# Patient Record
Sex: FEMALE | Race: OTHER | HISPANIC OR LATINO | URBAN - METROPOLITAN AREA
[De-identification: names, ages, dates, MRNs, and addresses within clinical notes are randomized per-mention and may not be internally consistent; named-entity substitution may affect disease eponyms.]

---

## 2017-03-11 ENCOUNTER — EMERGENCY (EMERGENCY)
Facility: HOSPITAL | Age: 27
LOS: 1 days | End: 2017-03-11
Admitting: INTERNAL MEDICINE
Payer: MEDICAID

## 2017-03-11 PROCEDURE — 99284 EMERGENCY DEPT VISIT MOD MDM: CPT | Mod: 25

## 2017-03-11 PROCEDURE — 81025 URINE PREGNANCY TEST: CPT

## 2017-03-11 PROCEDURE — 99284 EMERGENCY DEPT VISIT MOD MDM: CPT

## 2017-03-11 PROCEDURE — 76775 US EXAM ABDO BACK WALL LIM: CPT

## 2017-03-11 PROCEDURE — 76775 US EXAM ABDO BACK WALL LIM: CPT | Mod: 26

## 2017-03-11 PROCEDURE — 81001 URINALYSIS AUTO W/SCOPE: CPT

## 2017-03-27 ENCOUNTER — TRANSCRIPTION ENCOUNTER (OUTPATIENT)
Age: 27
End: 2017-03-27

## 2017-05-17 ENCOUNTER — TRANSCRIPTION ENCOUNTER (OUTPATIENT)
Age: 27
End: 2017-05-17

## 2017-08-05 ENCOUNTER — EMERGENCY (EMERGENCY)
Facility: HOSPITAL | Age: 27
LOS: 0 days | Discharge: HOME | End: 2017-08-05
Attending: EMERGENCY MEDICINE | Admitting: EMERGENCY MEDICINE
Payer: MEDICAID

## 2017-08-05 VITALS — WEIGHT: 141.98 LBS | HEIGHT: 62 IN

## 2017-08-05 VITALS
TEMPERATURE: 98 F | OXYGEN SATURATION: 100 % | HEART RATE: 81 BPM | DIASTOLIC BLOOD PRESSURE: 65 MMHG | SYSTOLIC BLOOD PRESSURE: 113 MMHG | RESPIRATION RATE: 18 BRPM

## 2017-08-05 DIAGNOSIS — R10.2 PELVIC AND PERINEAL PAIN: ICD-10-CM

## 2017-08-05 DIAGNOSIS — O26.899 OTHER SPECIFIED PREGNANCY RELATED CONDITIONS, UNSPECIFIED TRIMESTER: ICD-10-CM

## 2017-08-05 PROCEDURE — 99284 EMERGENCY DEPT VISIT MOD MDM: CPT

## 2017-08-05 NOTE — ED STATDOCS - OBJECTIVE STATEMENT
26 y/o F presents to ED c/o R pelvic pain. Pt states she is 17 weeks pregnant and that this is her third pregnancy, second was ectopic. Reports that when she is not moving, there is no pain. Pt has no other complaints and denies SOB, CP, fever/chills, n/v/d and HA. 28 y/o F presents to ED c/o R pelvic pain. Pt states she is 17 weeks pregnant and that this is her third pregnancy, second was ectopic. Reports that when she is not moving there is no pain. Pt has no other complaints and denies SOB, CP, fever/chills, n/v/d and HA.  Had US which showed IUP

## 2017-08-05 NOTE — ED ADULT NURSE NOTE - OBJECTIVE STATEMENT
Patient comes to ED for right pelvic pain. Pt is 17 weeks pregnant. pt denies any pain or blood in urine

## 2017-08-05 NOTE — ED STATDOCS - PROGRESS NOTE DETAILS
DEMARCUS Vang:   Patient has been seen, evaluated and orders have been written by the attending in intake. Patient is stable.  I will follow up the results of orders written and I will continue to evaluate/observe the patient.  Abigail Vang PA-C  144 by ryan.

## 2017-11-25 ENCOUNTER — EMERGENCY (EMERGENCY)
Facility: HOSPITAL | Age: 27
LOS: 0 days | Discharge: HOME | End: 2017-11-25
Attending: EMERGENCY MEDICINE | Admitting: EMERGENCY MEDICINE
Payer: MEDICAID

## 2017-11-25 VITALS
HEART RATE: 84 BPM | DIASTOLIC BLOOD PRESSURE: 77 MMHG | OXYGEN SATURATION: 100 % | SYSTOLIC BLOOD PRESSURE: 109 MMHG | RESPIRATION RATE: 17 BRPM | TEMPERATURE: 98 F

## 2017-11-25 VITALS — HEIGHT: 62 IN | WEIGHT: 151.02 LBS

## 2017-11-25 DIAGNOSIS — R10.9 UNSPECIFIED ABDOMINAL PAIN: ICD-10-CM

## 2017-11-25 DIAGNOSIS — K52.0 GASTROENTERITIS AND COLITIS DUE TO RADIATION: ICD-10-CM

## 2017-11-25 DIAGNOSIS — M79.1 MYALGIA: ICD-10-CM

## 2017-11-25 LAB
ALBUMIN SERPL ELPH-MCNC: 3.1 G/DL — LOW (ref 3.3–5)
ALP SERPL-CCNC: 143 U/L — HIGH (ref 40–120)
ALT FLD-CCNC: 20 U/L — SIGNIFICANT CHANGE UP (ref 12–78)
ANION GAP SERPL CALC-SCNC: 8 MMOL/L — SIGNIFICANT CHANGE UP (ref 5–17)
AST SERPL-CCNC: 24 U/L — SIGNIFICANT CHANGE UP (ref 15–37)
BASOPHILS # BLD AUTO: 0 K/UL — SIGNIFICANT CHANGE UP (ref 0–0.2)
BASOPHILS NFR BLD AUTO: 0.3 % — SIGNIFICANT CHANGE UP (ref 0–2)
BILIRUB SERPL-MCNC: 0.8 MG/DL — SIGNIFICANT CHANGE UP (ref 0.2–1.2)
BUN SERPL-MCNC: 9 MG/DL — SIGNIFICANT CHANGE UP (ref 7–23)
CALCIUM SERPL-MCNC: 8.6 MG/DL — SIGNIFICANT CHANGE UP (ref 8.5–10.1)
CHLORIDE SERPL-SCNC: 105 MMOL/L — SIGNIFICANT CHANGE UP (ref 96–108)
CO2 SERPL-SCNC: 24 MMOL/L — SIGNIFICANT CHANGE UP (ref 22–31)
CREAT SERPL-MCNC: 0.71 MG/DL — SIGNIFICANT CHANGE UP (ref 0.5–1.3)
EOSINOPHIL # BLD AUTO: 0.1 K/UL — SIGNIFICANT CHANGE UP (ref 0–0.5)
EOSINOPHIL NFR BLD AUTO: 1 % — SIGNIFICANT CHANGE UP (ref 0–6)
GLUCOSE SERPL-MCNC: 87 MG/DL — SIGNIFICANT CHANGE UP (ref 70–99)
HCT VFR BLD CALC: 36.7 % — SIGNIFICANT CHANGE UP (ref 34.5–45)
HGB BLD-MCNC: 12.2 G/DL — SIGNIFICANT CHANGE UP (ref 11.5–15.5)
LIDOCAIN IGE QN: 177 U/L — SIGNIFICANT CHANGE UP (ref 73–393)
LYMPHOCYTES # BLD AUTO: 1.1 K/UL — SIGNIFICANT CHANGE UP (ref 1–3.3)
LYMPHOCYTES # BLD AUTO: 11.8 % — LOW (ref 13–44)
MCHC RBC-ENTMCNC: 29.5 PG — SIGNIFICANT CHANGE UP (ref 27–34)
MCHC RBC-ENTMCNC: 33.2 GM/DL — SIGNIFICANT CHANGE UP (ref 32–36)
MCV RBC AUTO: 88.7 FL — SIGNIFICANT CHANGE UP (ref 80–100)
MONOCYTES # BLD AUTO: 0.4 K/UL — SIGNIFICANT CHANGE UP (ref 0–0.9)
MONOCYTES NFR BLD AUTO: 4.8 % — SIGNIFICANT CHANGE UP (ref 2–14)
NEUTROPHILS # BLD AUTO: 7.6 K/UL — HIGH (ref 1.8–7.4)
NEUTROPHILS NFR BLD AUTO: 82.1 % — HIGH (ref 43–77)
PLATELET # BLD AUTO: 223 K/UL — SIGNIFICANT CHANGE UP (ref 150–400)
POTASSIUM SERPL-MCNC: 3.8 MMOL/L — SIGNIFICANT CHANGE UP (ref 3.5–5.3)
POTASSIUM SERPL-SCNC: 3.8 MMOL/L — SIGNIFICANT CHANGE UP (ref 3.5–5.3)
PROT SERPL-MCNC: 7.9 GM/DL — SIGNIFICANT CHANGE UP (ref 6–8.3)
RBC # BLD: 4.13 M/UL — SIGNIFICANT CHANGE UP (ref 3.8–5.2)
RBC # FLD: 12.5 % — SIGNIFICANT CHANGE UP (ref 10.3–14.5)
SODIUM SERPL-SCNC: 137 MMOL/L — SIGNIFICANT CHANGE UP (ref 135–145)
WBC # BLD: 9.3 K/UL — SIGNIFICANT CHANGE UP (ref 3.8–10.5)
WBC # FLD AUTO: 9.3 K/UL — SIGNIFICANT CHANGE UP (ref 3.8–10.5)

## 2017-11-25 PROCEDURE — 99284 EMERGENCY DEPT VISIT MOD MDM: CPT

## 2017-11-25 RX ORDER — SODIUM CHLORIDE 9 MG/ML
2000 INJECTION INTRAMUSCULAR; INTRAVENOUS; SUBCUTANEOUS ONCE
Qty: 0 | Refills: 0 | Status: COMPLETED | OUTPATIENT
Start: 2017-11-25 | End: 2017-11-25

## 2017-11-25 RX ORDER — ACETAMINOPHEN 500 MG
650 TABLET ORAL ONCE
Qty: 0 | Refills: 0 | Status: COMPLETED | OUTPATIENT
Start: 2017-11-25 | End: 2017-11-25

## 2017-11-25 RX ORDER — FAMOTIDINE 10 MG/ML
20 INJECTION INTRAVENOUS ONCE
Qty: 0 | Refills: 0 | Status: COMPLETED | OUTPATIENT
Start: 2017-11-25 | End: 2017-11-25

## 2017-11-25 RX ORDER — SODIUM CHLORIDE 9 MG/ML
3 INJECTION INTRAMUSCULAR; INTRAVENOUS; SUBCUTANEOUS ONCE
Qty: 0 | Refills: 0 | Status: COMPLETED | OUTPATIENT
Start: 2017-11-25 | End: 2017-11-25

## 2017-11-25 RX ORDER — ONDANSETRON 8 MG/1
4 TABLET, FILM COATED ORAL ONCE
Qty: 0 | Refills: 0 | Status: COMPLETED | OUTPATIENT
Start: 2017-11-25 | End: 2017-11-25

## 2017-11-25 RX ADMIN — SODIUM CHLORIDE 3 MILLILITER(S): 9 INJECTION INTRAMUSCULAR; INTRAVENOUS; SUBCUTANEOUS at 21:35

## 2017-11-25 RX ADMIN — ONDANSETRON 4 MILLIGRAM(S): 8 TABLET, FILM COATED ORAL at 21:35

## 2017-11-25 RX ADMIN — FAMOTIDINE 20 MILLIGRAM(S): 10 INJECTION INTRAVENOUS at 21:35

## 2017-11-25 RX ADMIN — SODIUM CHLORIDE 2000 MILLILITER(S): 9 INJECTION INTRAMUSCULAR; INTRAVENOUS; SUBCUTANEOUS at 21:35

## 2017-11-25 RX ADMIN — Medication 650 MILLIGRAM(S): at 21:35

## 2017-11-25 NOTE — ED ADULT NURSE NOTE - OBJECTIVE STATEMENT
pt amb to ST1, A&ox3, skin w/d/i, 34 weeks pregnant without complications, G3, presents with nausea, vomiting, and diarrhea that began this morning associated w/ abdm pain, tolerating PO intake however decreased PO intake, Sl placed, labs sent, pt moved to  for further monitoring.

## 2017-11-25 NOTE — ED ADULT NURSE REASSESSMENT NOTE - NS ED NURSE REASSESS COMMENT FT1
+ accels, moderate variability, no decels. Pt with no vaginal bleeding or leaking of fluid. Pt resting comfortably with no complaints. FHR tracing reviewed by Dr Hernandez.

## 2017-11-25 NOTE — ED STATDOCS - PHYSICAL EXAMINATION
GEN: AOX3, NAD. HEENT: Throat clear. Head NC/AT. NECK: Supple, No JVD. FROM. C-spine non-tender. CV:S1S2, RRR, LUNGS: CTA/b/l, no w/r/r. ABD: Soft, NT/ND, no rebound, no guarding. No CVAT. EXT: No e/c/c. 2+ distal pulses. SKIN: No rashes. NEURO: No focal deficits. CN II-XII intact. FROM. 5/5 motor and sensory. DEMARCUS Cancino GEN: AOX3, NAD. HEENT: Throat clear. Head NC/AT. NECK: Supple, No JVD. FROM. C-spine non-tender. CV:S1S2, RRR, LUNGS: CTA/b/l, no w/r/r. ABD: Soft, NT/ND, no rebound, no guarding. Pregnant uterus. No CVAT. EXT: No e/c/c. 2+ distal pulses. SKIN: No rashes. NEURO: No focal deficits. CN II-XII intact. FROM. 5/5 motor and sensory. DEMARCUS Cancino

## 2017-11-25 NOTE — ED STATDOCS - OBJECTIVE STATEMENT
26 yo female, 34 weeks pregnant without complications, G3, presents with nausea, vomiting, and diarrhea that began this morning.  Has abdominal pressure and myalgias.  No sick contacts.  Decreased PO intake.

## 2017-11-25 NOTE — ED STATDOCS - PROGRESS NOTE DETAILS
patient cleared by OB for dc. Patient is feeling much better, tests/labs reviewed. case discussed with attending. OK to dc home. DEMARCUS Cancino

## 2017-11-25 NOTE — ED STATDOCS - ATTENDING CONTRIBUTION TO CARE
Attending Contribution to Care: I, Nelly Box, performed the initial face to face bedside interview with this patient regarding history of present illness, review of symptoms and relevant past medical, social and family history.  I completed an independent physical examination.  I was the initial provider who evaluated this patient. I have signed out the follow up of any pending tests (i.e. labs, radiological studies) to the ACP.  I have communicated the patient’s plan of care and disposition with the ACP.

## 2017-11-25 NOTE — ED ADULT NURSE NOTE - PMH
Calculus of gallbladder with other cholecystitis without obstruction    Ectopic pregnancy, unspecified  S/P Methotrexate Oct 2015  No pertinent past medical history

## 2017-11-25 NOTE — ED STATDOCS - MEDICAL DECISION MAKING DETAILS
26 yo female, 34 weeks gestation, with nausea, vomiting, diarrhea.  Plan IVF, Tylenol, zofran, pepcid, labs, reassess.

## 2017-12-01 ENCOUNTER — OUTPATIENT (OUTPATIENT)
Dept: OUTPATIENT SERVICES | Facility: HOSPITAL | Age: 27
LOS: 1 days | End: 2017-12-01
Payer: MEDICAID

## 2017-12-01 PROCEDURE — G9001: CPT

## 2017-12-27 ENCOUNTER — INPATIENT (INPATIENT)
Facility: HOSPITAL | Age: 27
LOS: 1 days | Discharge: HOME | End: 2017-12-29
Attending: OBSTETRICS & GYNECOLOGY | Admitting: OBSTETRICS & GYNECOLOGY

## 2017-12-27 VITALS — WEIGHT: 158.73 LBS | HEIGHT: 62 IN

## 2017-12-27 LAB
BASOPHILS # BLD AUTO: 0 K/UL — SIGNIFICANT CHANGE UP (ref 0–0.2)
BASOPHILS # BLD AUTO: 0.1 K/UL — SIGNIFICANT CHANGE UP (ref 0–0.2)
BASOPHILS NFR BLD AUTO: 0.5 % — SIGNIFICANT CHANGE UP (ref 0–2)
BASOPHILS NFR BLD AUTO: 0.6 % — SIGNIFICANT CHANGE UP (ref 0–2)
EOSINOPHIL # BLD AUTO: 0 K/UL — SIGNIFICANT CHANGE UP (ref 0–0.5)
EOSINOPHIL # BLD AUTO: 0.1 K/UL — SIGNIFICANT CHANGE UP (ref 0–0.5)
EOSINOPHIL NFR BLD AUTO: 0.3 % — SIGNIFICANT CHANGE UP (ref 0–6)
EOSINOPHIL NFR BLD AUTO: 0.9 % — SIGNIFICANT CHANGE UP (ref 0–6)
HCT VFR BLD CALC: 30.8 % — LOW (ref 34.5–45)
HCT VFR BLD CALC: 34.6 % — SIGNIFICANT CHANGE UP (ref 34.5–45)
HGB BLD-MCNC: 10.1 G/DL — LOW (ref 11.5–15.5)
HGB BLD-MCNC: 11 G/DL — LOW (ref 11.5–15.5)
LYMPHOCYTES # BLD AUTO: 19.7 % — SIGNIFICANT CHANGE UP (ref 13–44)
LYMPHOCYTES # BLD AUTO: 2.4 K/UL — SIGNIFICANT CHANGE UP (ref 1–3.3)
LYMPHOCYTES # BLD AUTO: 2.6 K/UL — SIGNIFICANT CHANGE UP (ref 1–3.3)
LYMPHOCYTES # BLD AUTO: 34.4 % — SIGNIFICANT CHANGE UP (ref 13–44)
MCHC RBC-ENTMCNC: 28.1 PG — SIGNIFICANT CHANGE UP (ref 27–34)
MCHC RBC-ENTMCNC: 28.9 PG — SIGNIFICANT CHANGE UP (ref 27–34)
MCHC RBC-ENTMCNC: 31.9 GM/DL — LOW (ref 32–36)
MCHC RBC-ENTMCNC: 32.8 GM/DL — SIGNIFICANT CHANGE UP (ref 32–36)
MCV RBC AUTO: 88 FL — SIGNIFICANT CHANGE UP (ref 80–100)
MCV RBC AUTO: 88.1 FL — SIGNIFICANT CHANGE UP (ref 80–100)
MONOCYTES # BLD AUTO: 0.4 K/UL — SIGNIFICANT CHANGE UP (ref 0–0.9)
MONOCYTES # BLD AUTO: 0.8 K/UL — SIGNIFICANT CHANGE UP (ref 0–0.9)
MONOCYTES NFR BLD AUTO: 6 % — SIGNIFICANT CHANGE UP (ref 2–14)
MONOCYTES NFR BLD AUTO: 6.2 % — SIGNIFICANT CHANGE UP (ref 2–14)
NEUTROPHILS # BLD AUTO: 4.3 K/UL — SIGNIFICANT CHANGE UP (ref 1.8–7.4)
NEUTROPHILS # BLD AUTO: 8.9 K/UL — HIGH (ref 1.8–7.4)
NEUTROPHILS NFR BLD AUTO: 58.1 % — SIGNIFICANT CHANGE UP (ref 43–77)
NEUTROPHILS NFR BLD AUTO: 73.3 % — SIGNIFICANT CHANGE UP (ref 43–77)
PLATELET # BLD AUTO: 160 K/UL — SIGNIFICANT CHANGE UP (ref 150–400)
PLATELET # BLD AUTO: 182 K/UL — SIGNIFICANT CHANGE UP (ref 150–400)
RBC # BLD: 3.49 M/UL — LOW (ref 3.8–5.2)
RBC # BLD: 3.93 M/UL — SIGNIFICANT CHANGE UP (ref 3.8–5.2)
RBC # FLD: 13.4 % — SIGNIFICANT CHANGE UP (ref 10.3–14.5)
RBC # FLD: 13.5 % — SIGNIFICANT CHANGE UP (ref 10.3–14.5)
T PALLIDUM AB TITR SER: NEGATIVE — SIGNIFICANT CHANGE UP
WBC # BLD: 12.2 K/UL — HIGH (ref 3.8–10.5)
WBC # BLD: 7.5 K/UL — SIGNIFICANT CHANGE UP (ref 3.8–10.5)
WBC # FLD AUTO: 12.2 K/UL — HIGH (ref 3.8–10.5)
WBC # FLD AUTO: 7.5 K/UL — SIGNIFICANT CHANGE UP (ref 3.8–10.5)

## 2017-12-27 RX ORDER — OXYCODONE AND ACETAMINOPHEN 5; 325 MG/1; MG/1
2 TABLET ORAL EVERY 6 HOURS
Qty: 0 | Refills: 0 | Status: DISCONTINUED | OUTPATIENT
Start: 2017-12-27 | End: 2017-12-29

## 2017-12-27 RX ORDER — DIPHENHYDRAMINE HCL 50 MG
25 CAPSULE ORAL EVERY 6 HOURS
Qty: 0 | Refills: 0 | Status: DISCONTINUED | OUTPATIENT
Start: 2017-12-27 | End: 2017-12-29

## 2017-12-27 RX ORDER — GLYCERIN ADULT
1 SUPPOSITORY, RECTAL RECTAL AT BEDTIME
Qty: 0 | Refills: 0 | Status: DISCONTINUED | OUTPATIENT
Start: 2017-12-27 | End: 2017-12-29

## 2017-12-27 RX ORDER — ACETAMINOPHEN 500 MG
650 TABLET ORAL EVERY 6 HOURS
Qty: 0 | Refills: 0 | Status: DISCONTINUED | OUTPATIENT
Start: 2017-12-27 | End: 2017-12-29

## 2017-12-27 RX ORDER — OXYTOCIN 10 UNIT/ML
333.33 VIAL (ML) INJECTION
Qty: 20 | Refills: 0 | Status: DISCONTINUED | OUTPATIENT
Start: 2017-12-27 | End: 2017-12-29

## 2017-12-27 RX ORDER — OXYTOCIN 10 UNIT/ML
41.67 VIAL (ML) INJECTION
Qty: 20 | Refills: 0 | Status: DISCONTINUED | OUTPATIENT
Start: 2017-12-27 | End: 2017-12-29

## 2017-12-27 RX ORDER — SODIUM CHLORIDE 9 MG/ML
125 INJECTION, SOLUTION INTRAVENOUS ONCE
Qty: 0 | Refills: 0 | Status: COMPLETED | OUTPATIENT
Start: 2017-12-27 | End: 2017-12-27

## 2017-12-27 RX ORDER — MAGNESIUM HYDROXIDE 400 MG/1
30 TABLET, CHEWABLE ORAL
Qty: 0 | Refills: 0 | Status: DISCONTINUED | OUTPATIENT
Start: 2017-12-27 | End: 2017-12-29

## 2017-12-27 RX ORDER — PENICILLIN G POTASSIUM 5000000 [IU]/1
POWDER, FOR SOLUTION INTRAMUSCULAR; INTRAPLEURAL; INTRATHECAL; INTRAVENOUS
Qty: 0 | Refills: 0 | Status: DISCONTINUED | OUTPATIENT
Start: 2017-12-27 | End: 2017-12-27

## 2017-12-27 RX ORDER — SODIUM CHLORIDE 9 MG/ML
1000 INJECTION, SOLUTION INTRAVENOUS
Qty: 0 | Refills: 0 | Status: DISCONTINUED | OUTPATIENT
Start: 2017-12-27 | End: 2017-12-27

## 2017-12-27 RX ORDER — PENICILLIN G POTASSIUM 5000000 [IU]/1
5 POWDER, FOR SOLUTION INTRAMUSCULAR; INTRAPLEURAL; INTRATHECAL; INTRAVENOUS ONCE
Qty: 0 | Refills: 0 | Status: COMPLETED | OUTPATIENT
Start: 2017-12-27 | End: 2017-12-27

## 2017-12-27 RX ORDER — SIMETHICONE 80 MG/1
80 TABLET, CHEWABLE ORAL EVERY 6 HOURS
Qty: 0 | Refills: 0 | Status: DISCONTINUED | OUTPATIENT
Start: 2017-12-27 | End: 2017-12-29

## 2017-12-27 RX ORDER — OXYTOCIN 10 UNIT/ML
333.33 VIAL (ML) INJECTION
Qty: 20 | Refills: 0 | Status: COMPLETED | OUTPATIENT
Start: 2017-12-27

## 2017-12-27 RX ORDER — IBUPROFEN 200 MG
600 TABLET ORAL EVERY 6 HOURS
Qty: 0 | Refills: 0 | Status: DISCONTINUED | OUTPATIENT
Start: 2017-12-27 | End: 2017-12-29

## 2017-12-27 RX ORDER — DOCUSATE SODIUM 100 MG
100 CAPSULE ORAL
Qty: 0 | Refills: 0 | Status: DISCONTINUED | OUTPATIENT
Start: 2017-12-27 | End: 2017-12-29

## 2017-12-27 RX ORDER — LANOLIN
1 OINTMENT (GRAM) TOPICAL EVERY 6 HOURS
Qty: 0 | Refills: 0 | Status: DISCONTINUED | OUTPATIENT
Start: 2017-12-27 | End: 2017-12-29

## 2017-12-27 RX ORDER — PENICILLIN G POTASSIUM 5000000 [IU]/1
2.5 POWDER, FOR SOLUTION INTRAMUSCULAR; INTRAPLEURAL; INTRATHECAL; INTRAVENOUS EVERY 4 HOURS
Qty: 0 | Refills: 0 | Status: DISCONTINUED | OUTPATIENT
Start: 2017-12-27 | End: 2017-12-27

## 2017-12-27 RX ORDER — DIBUCAINE 1 %
1 OINTMENT (GRAM) RECTAL EVERY 4 HOURS
Qty: 0 | Refills: 0 | Status: DISCONTINUED | OUTPATIENT
Start: 2017-12-27 | End: 2017-12-29

## 2017-12-27 RX ORDER — PRAMOXINE HYDROCHLORIDE 150 MG/15G
1 AEROSOL, FOAM RECTAL EVERY 4 HOURS
Qty: 0 | Refills: 0 | Status: DISCONTINUED | OUTPATIENT
Start: 2017-12-27 | End: 2017-12-29

## 2017-12-27 RX ORDER — SODIUM CHLORIDE 9 MG/ML
3 INJECTION INTRAMUSCULAR; INTRAVENOUS; SUBCUTANEOUS EVERY 8 HOURS
Qty: 0 | Refills: 0 | Status: DISCONTINUED | OUTPATIENT
Start: 2017-12-27 | End: 2017-12-29

## 2017-12-27 RX ORDER — CITRIC ACID/SODIUM CITRATE 300-500 MG
15 SOLUTION, ORAL ORAL EVERY 4 HOURS
Qty: 0 | Refills: 0 | Status: DISCONTINUED | OUTPATIENT
Start: 2017-12-27 | End: 2017-12-27

## 2017-12-27 RX ORDER — TETANUS TOXOID, REDUCED DIPHTHERIA TOXOID AND ACELLULAR PERTUSSIS VACCINE, ADSORBED 5; 2.5; 8; 8; 2.5 [IU]/.5ML; [IU]/.5ML; UG/.5ML; UG/.5ML; UG/.5ML
0.5 SUSPENSION INTRAMUSCULAR ONCE
Qty: 0 | Refills: 0 | Status: COMPLETED | OUTPATIENT
Start: 2017-12-27

## 2017-12-27 RX ORDER — HYDROCORTISONE 1 %
1 OINTMENT (GRAM) TOPICAL EVERY 4 HOURS
Qty: 0 | Refills: 0 | Status: DISCONTINUED | OUTPATIENT
Start: 2017-12-27 | End: 2017-12-29

## 2017-12-27 RX ORDER — AER TRAVELER 0.5 G/1
1 SOLUTION RECTAL; TOPICAL EVERY 4 HOURS
Qty: 0 | Refills: 0 | Status: DISCONTINUED | OUTPATIENT
Start: 2017-12-27 | End: 2017-12-29

## 2017-12-27 RX ADMIN — Medication 600 MILLIGRAM(S): at 20:14

## 2017-12-27 RX ADMIN — SODIUM CHLORIDE 250 MILLILITER(S): 9 INJECTION, SOLUTION INTRAVENOUS at 08:22

## 2017-12-27 RX ADMIN — Medication 100 MILLIGRAM(S): at 20:14

## 2017-12-27 RX ADMIN — PENICILLIN G POTASSIUM 200 MILLION UNIT(S): 5000000 POWDER, FOR SOLUTION INTRAMUSCULAR; INTRAPLEURAL; INTRATHECAL; INTRAVENOUS at 08:05

## 2017-12-27 RX ADMIN — SODIUM CHLORIDE 125 MILLILITER(S): 9 INJECTION, SOLUTION INTRAVENOUS at 11:17

## 2017-12-27 RX ADMIN — Medication 1000 MILLIUNIT(S)/MIN: at 15:50

## 2017-12-27 RX ADMIN — Medication 600 MILLIGRAM(S): at 20:15

## 2017-12-27 RX ADMIN — PENICILLIN G POTASSIUM 200 MILLION UNIT(S): 5000000 POWDER, FOR SOLUTION INTRAMUSCULAR; INTRAPLEURAL; INTRATHECAL; INTRAVENOUS at 12:10

## 2017-12-27 RX ADMIN — OXYCODONE AND ACETAMINOPHEN 2 TABLET(S): 5; 325 TABLET ORAL at 20:14

## 2017-12-27 RX ADMIN — OXYCODONE AND ACETAMINOPHEN 2 TABLET(S): 5; 325 TABLET ORAL at 20:15

## 2017-12-28 DIAGNOSIS — R69 ILLNESS, UNSPECIFIED: ICD-10-CM

## 2017-12-28 RX ORDER — TETANUS TOXOID, REDUCED DIPHTHERIA TOXOID AND ACELLULAR PERTUSSIS VACCINE, ADSORBED 5; 2.5; 8; 8; 2.5 [IU]/.5ML; [IU]/.5ML; UG/.5ML; UG/.5ML; UG/.5ML
0.5 SUSPENSION INTRAMUSCULAR ONCE
Qty: 0 | Refills: 0 | Status: COMPLETED | OUTPATIENT
Start: 2017-12-28 | End: 2017-12-28

## 2017-12-28 RX ADMIN — Medication 600 MILLIGRAM(S): at 06:31

## 2017-12-28 RX ADMIN — Medication 100 MILLIGRAM(S): at 23:13

## 2017-12-28 RX ADMIN — TETANUS TOXOID, REDUCED DIPHTHERIA TOXOID AND ACELLULAR PERTUSSIS VACCINE, ADSORBED 0.5 MILLILITER(S): 5; 2.5; 8; 8; 2.5 SUSPENSION INTRAMUSCULAR at 22:24

## 2017-12-28 RX ADMIN — Medication 600 MILLIGRAM(S): at 22:24

## 2017-12-28 RX ADMIN — Medication 100 MILLIGRAM(S): at 12:04

## 2017-12-28 RX ADMIN — Medication 600 MILLIGRAM(S): at 23:24

## 2017-12-28 RX ADMIN — OXYCODONE AND ACETAMINOPHEN 2 TABLET(S): 5; 325 TABLET ORAL at 23:12

## 2017-12-28 RX ADMIN — Medication 1 TABLET(S): at 12:03

## 2017-12-29 ENCOUNTER — TRANSCRIPTION ENCOUNTER (OUTPATIENT)
Age: 27
End: 2017-12-29

## 2017-12-29 VITALS
SYSTOLIC BLOOD PRESSURE: 107 MMHG | HEART RATE: 82 BPM | OXYGEN SATURATION: 100 % | RESPIRATION RATE: 18 BRPM | TEMPERATURE: 98 F | DIASTOLIC BLOOD PRESSURE: 72 MMHG

## 2017-12-29 RX ORDER — IBUPROFEN 200 MG
1 TABLET ORAL
Qty: 28 | Refills: 0 | OUTPATIENT
Start: 2017-12-29 | End: 2018-01-04

## 2017-12-29 RX ORDER — IBUPROFEN 200 MG
1 TABLET ORAL
Qty: 28 | Refills: 0
Start: 2017-12-29 | End: 2018-01-04

## 2017-12-29 RX ADMIN — OXYCODONE AND ACETAMINOPHEN 2 TABLET(S): 5; 325 TABLET ORAL at 00:12

## 2017-12-29 NOTE — DISCHARGE NOTE OB - CARE PLAN
Principal Discharge DX:	Vaginal delivery  Goal:	healthy baby and healthy mother  Instructions for follow-up, activity and diet:	- follow up with Dr. Marin 6 weeks  - continue to breast feed  -  no intercourse for 6 weeks  - call primary care doctor or OBGYN if fever of 100.4

## 2017-12-29 NOTE — DISCHARGE NOTE OB - HOSPITAL COURSE
26 yo F  at 38 4/7 weeks JOE 18 presents in active labor at  L&D on 17. Pt describes experiencing contractions.  Pt has received prenatal care with Dr. Marin. Pt describes an uneventful pregnancy.  Pt was GBS positive and received ampicillin prior to delivery  Patient was  fully dilated and vtx +1. membranes intact. AROM done and scant clear fluid . Pt had a  live vertex female over intact perineum. Apgars: 9/9. Spontaneous expulsion of normal intact placenta. EBL:200 cc. Pt is doing well and stable for discharge. Pt denies any fever, headache, body aches, chills, sob, wheezing, chest congestion, chest pain, palpitations, nausea, vomiting, abdominal pain, diarrhea, or constipation. Pt denies any vaginal bleeding, b/l leg swelling, dysuria, urinary frequency, urgency. Pt has no further complaints. Both patient and  understand and agree with management of care. Pt will follow up with Dr. Landa in 6 weeks.

## 2017-12-29 NOTE — DISCHARGE NOTE OB - MEDICATION SUMMARY - MEDICATIONS TO STOP TAKING
I will STOP taking the medications listed below when I get home from the hospital:    Depo-Provera  --  intramuscular   -- Every 3 months    acetaminophen-oxyCODONE 325 mg-5 mg oral tablet  -- 1 tab(s) by mouth every 4 hours, As needed, Moderate Pain

## 2017-12-29 NOTE — DISCHARGE NOTE OB - CARE PROVIDER_API CALL
Ousmane Landa (MD), Obstetrics and Gynecology  [address] [place] [address] [address]  [place] [address]  Phone: [phone]  Fax: [phone]

## 2017-12-29 NOTE — DISCHARGE NOTE OB - PLAN OF CARE
healthy baby and healthy mother - follow up with Dr. Marin 6 weeks  - continue to breast feed  -  no intercourse for 6 weeks  - call primary care doctor or OBGYN if fever of 100.4

## 2017-12-29 NOTE — PROGRESS NOTE ADULT - SUBJECTIVE AND OBJECTIVE BOX
26 yo F  at 38 4/7 weeks JOE 18 presents in active labor.  Pt is s/p  day 2 and doing well.  Pt denies any fever, headache, vision changes, dizziness, body aches, chills, sob, wheezing, chest congestion, chest pain, palpitations, nausea, vomiting abdominal pain, diarrhea or constipation.  Pt denies any dysuria, breast pain, vaginal bleeding, b/l lower extremity swelling. She is urinating well, has not had a bowel movement but is passing gas. Pt is also ambulating well. Pt has no further complaints.     ICU Vital Signs Last 24 Hrs  T(C): 36.5 (29 Dec 2017 07:53), Max: 36.5 (29 Dec 2017 07:53)  T(F): 97.7 (29 Dec 2017 07:53), Max: 97.7 (29 Dec 2017 07:53)  HR: 82 (29 Dec 2017 07:53) (69 - 82)  BP: 107/72 (29 Dec 2017 07:53) (97/60 - 107/72)  RR: 18 (29 Dec 2017 07:53) (18 - 18)  SpO2: 100% (29 Dec 2017 07:53) (100% - 100%)    Gen: NAD  Lungs: CTA b/l air entry, no wheezing crackles, rales or ronchi  CVS: S1 and S2 no rubs, murmurs or gallops  Abd: soft, NT, ND, fundus firm below umbilicus  Ext: no tenderness  Neuro: AAOx 3     Labs    A: 32y PPD# 2 s/p    Doing well    Plan:  - encourage to continue breast feeding  - continue ambulation  - possible discharge today  - follow up with Dr. Landa in 6 weeks      Case Discussed with Dr. Rolf Dan MD PGY 1

## 2017-12-29 NOTE — DISCHARGE NOTE OB - MEDICATION SUMMARY - MEDICATIONS TO TAKE
I will START or STAY ON the medications listed below when I get home from the hospital:    ibuprofen 600 mg oral tablet  -- 1 tab(s) by mouth every 6 hours, Take with food.  -- Indication: For pain    Prenatal Multivitamins with Folic Acid 1 mg oral tablet  -- 1 tab(s) by mouth once a day  -- Indication: For suppliment

## 2018-01-05 DIAGNOSIS — Z3A.38 38 WEEKS GESTATION OF PREGNANCY: ICD-10-CM

## 2018-01-05 DIAGNOSIS — O99.820 STREPTOCOCCUS B CARRIER STATE COMPLICATING PREGNANCY: ICD-10-CM

## 2018-03-05 NOTE — ED ADULT NURSE NOTE - NS ED NURSE LEVEL OF CONSCIOUSNESS SPEECH
Improved after fluconazole. Gave rx for nystatin powder to use at first signs of rash. Pt will d/w OBGYN the possibility of diflucan with each period.   Speaking Coherently

## 2018-04-01 ENCOUNTER — OUTPATIENT (OUTPATIENT)
Dept: OUTPATIENT SERVICES | Facility: HOSPITAL | Age: 28
LOS: 1 days | End: 2018-04-01
Payer: MEDICAID

## 2018-04-01 PROCEDURE — G9001: CPT

## 2018-04-06 ENCOUNTER — APPOINTMENT (OUTPATIENT)
Dept: ANTEPARTUM | Facility: CLINIC | Age: 28
End: 2018-04-06
Payer: MEDICAID

## 2018-04-06 ENCOUNTER — ASOB RESULT (OUTPATIENT)
Age: 28
End: 2018-04-06

## 2018-04-06 PROCEDURE — 76857 US EXAM PELVIC LIMITED: CPT

## 2018-04-06 PROCEDURE — 76830 TRANSVAGINAL US NON-OB: CPT

## 2018-04-19 DIAGNOSIS — R69 ILLNESS, UNSPECIFIED: ICD-10-CM

## 2018-07-06 ENCOUNTER — APPOINTMENT (OUTPATIENT)
Dept: NEUROLOGY | Facility: CLINIC | Age: 28
End: 2018-07-06

## 2018-07-10 ENCOUNTER — APPOINTMENT (OUTPATIENT)
Dept: NEUROLOGY | Facility: CLINIC | Age: 28
End: 2018-07-10
Payer: MEDICAID

## 2018-07-10 VITALS
HEIGHT: 63 IN | HEART RATE: 88 BPM | SYSTOLIC BLOOD PRESSURE: 88 MMHG | WEIGHT: 135 LBS | DIASTOLIC BLOOD PRESSURE: 68 MMHG | BODY MASS INDEX: 23.92 KG/M2

## 2018-07-10 DIAGNOSIS — Z87.19 PERSONAL HISTORY OF OTHER DISEASES OF THE DIGESTIVE SYSTEM: ICD-10-CM

## 2018-07-10 PROCEDURE — 99204 OFFICE O/P NEW MOD 45 MIN: CPT

## 2018-07-10 RX ORDER — CHLORHEXIDINE GLUCONATE, 0.12% ORAL RINSE 1.2 MG/ML
0.12 SOLUTION DENTAL
Qty: 473 | Refills: 0 | Status: DISCONTINUED | COMMUNITY
Start: 2018-02-02

## 2018-07-10 RX ORDER — IBUPROFEN 600 MG/1
600 TABLET, FILM COATED ORAL
Qty: 28 | Refills: 0 | Status: DISCONTINUED | COMMUNITY
Start: 2017-12-29

## 2018-07-10 RX ORDER — NORETHINDRONE ACETATE AND ETHINYL ESTRADIOL, ETHINYL ESTRADIOL AND FERROUS FUMARATE 1MG-10(24)
1 MG-10 MCG / KIT ORAL
Qty: 28 | Refills: 0 | Status: DISCONTINUED | COMMUNITY
Start: 2018-02-13

## 2018-07-10 RX ORDER — TERCONAZOLE 4 MG/G
0.4 CREAM VAGINAL
Qty: 45 | Refills: 0 | Status: DISCONTINUED | COMMUNITY
Start: 2018-04-09

## 2018-07-20 ENCOUNTER — FORM ENCOUNTER (OUTPATIENT)
Age: 28
End: 2018-07-20

## 2018-07-21 ENCOUNTER — OUTPATIENT (OUTPATIENT)
Dept: OUTPATIENT SERVICES | Facility: HOSPITAL | Age: 28
LOS: 1 days | End: 2018-07-21
Payer: MEDICAID

## 2018-07-21 ENCOUNTER — APPOINTMENT (OUTPATIENT)
Dept: MRI IMAGING | Facility: CLINIC | Age: 28
End: 2018-07-21
Payer: MEDICAID

## 2018-07-21 DIAGNOSIS — Z00.8 ENCOUNTER FOR OTHER GENERAL EXAMINATION: ICD-10-CM

## 2018-07-21 PROCEDURE — 70551 MRI BRAIN STEM W/O DYE: CPT | Mod: 26

## 2018-07-21 PROCEDURE — 70551 MRI BRAIN STEM W/O DYE: CPT

## 2018-08-20 ENCOUNTER — APPOINTMENT (OUTPATIENT)
Dept: NEUROLOGY | Facility: CLINIC | Age: 28
End: 2018-08-20

## 2019-03-04 ENCOUNTER — APPOINTMENT (OUTPATIENT)
Age: 29
End: 2019-03-04

## 2019-03-10 ENCOUNTER — TRANSCRIPTION ENCOUNTER (OUTPATIENT)
Age: 29
End: 2019-03-10

## 2019-03-18 ENCOUNTER — APPOINTMENT (OUTPATIENT)
Age: 29
End: 2019-03-18

## 2019-03-18 ENCOUNTER — APPOINTMENT (OUTPATIENT)
Dept: NEUROLOGY | Facility: CLINIC | Age: 29
End: 2019-03-18

## 2019-04-08 ENCOUNTER — APPOINTMENT (OUTPATIENT)
Dept: NEUROLOGY | Facility: CLINIC | Age: 29
End: 2019-04-08

## 2019-05-02 ENCOUNTER — TRANSCRIPTION ENCOUNTER (OUTPATIENT)
Age: 29
End: 2019-05-02

## 2019-06-19 ENCOUNTER — TRANSCRIPTION ENCOUNTER (OUTPATIENT)
Age: 29
End: 2019-06-19

## 2019-08-16 ENCOUNTER — APPOINTMENT (OUTPATIENT)
Dept: NEUROLOGY | Facility: CLINIC | Age: 29
End: 2019-08-16
Payer: MEDICAID

## 2019-08-16 VITALS
WEIGHT: 140 LBS | HEART RATE: 80 BPM | HEIGHT: 63 IN | BODY MASS INDEX: 24.8 KG/M2 | SYSTOLIC BLOOD PRESSURE: 98 MMHG | DIASTOLIC BLOOD PRESSURE: 54 MMHG

## 2019-08-16 PROCEDURE — 99213 OFFICE O/P EST LOW 20 MIN: CPT

## 2019-08-16 NOTE — HISTORY OF PRESENT ILLNESS
[FreeTextEntry1] : I last saw Ms. Holley on 7/10/18.\par She repots that she continues to have pain on her scalp to the right of the vertex of her skull.\par It is tender to touch. The pain is there when she is not touching it as well.\par There is no associated nausea, vomiting, light sensitivity, noise sensitivity or visual changes.\par The pain is constant and when she touches it is a 6-7/10. Otherwise it is a constant discomfort.\par \par She saw dermatology for hair loss and mentioned the scalp discomfort. He said that she had a dermatitis and was given a shampoo which helped with itching of her scalp but not with the pain.\par \par She continues to have neck pain. The pain is localized without any radiation into her arms.\par There is no weakness in her arms or numbness/tingling.\par \par

## 2019-08-16 NOTE — CONSULT LETTER
[Dear  ___] : Dear  [unfilled], [Consult Letter:] : I had the pleasure of evaluating your patient, [unfilled]. [Please see my note below.] : Please see my note below. [Consult Closing:] : Thank you very much for allowing me to participate in the care of this patient.  If you have any questions, please do not hesitate to contact me. [FreeTextEntry2] : Kaushal Chacko [FreeTextEntry3] : Sincerely,\par \par \par Taylor Marcos MD\par Diplomate, American Academy of Psychiatry and Neurology\par Board Certified in the Subspecialty of Clinical Neurophysiology\par Board Certified in the Subspecialty of Sleep Medicine\par Board Certified in the Subspecialty of Epilepsy\par

## 2019-08-16 NOTE — DATA REVIEWED
[de-identified] : MRI brain 7/21/18: No parenchymal abnormality. 4 mm tonsillar ectopia, an anatomic variant. Tiny retention cysts in the bilateral maxillary alveolar recesses, the largest measuring 1.6 cm on the right.

## 2019-08-16 NOTE — PHYSICAL EXAM
[FreeTextEntry1] : Examination:\par Constitutional: normal, no apparent distress\par Eyes: normal conjunctiva b/l, no ptosis, visual fields full\par Respiratory: no respiratory distress, normal effort, normal auscultation\par Cardiovascular: normal rate, rhythm, no murmurs\par Neck: supple, no masses\par Vascular: carotids normal\par Skin: normal color, no rashes\par Psych: normal mood, affect\par skull: no abnormal lesions seen or felt with palpation\par \par Neurological:\par Memory: normal memory, oriented to person, place, time\par Language intact/no aphasia\par Cranial Nerves: II-XII intact, Pupils equally round and reactive to light, ocular muscles/movements intact, no ptosis, no facial weakness, tongue protrudes normally in the midline, \par Motor: normal tone, no pronator drift, full strength in all four extremities in the proximal and distal muscle groups\par Coordination: Fine motor movements intact, rapid alternating movements intact, finger to nose intact bilaterally\par Sensory: intact to light touch\par DTRs: symmetric, normal\par Gait: narrow based, steady\par

## 2019-08-16 NOTE — DISCUSSION/SUMMARY
[FreeTextEntry1] : Ms. Holley is a 29 year old woman with daily headaches/skull pain\par \par -Her neurological examination remains non-focal.\par -Previous MRI brain did not show any significant intracranial abnormality.\par -Symptoms seem to be more superficial involving scalp/skull.\par -Will get CT head with focus on bony windows to r/o any osseous lesion.\par -Reassurance given.\par -Trial of cyclobenzaprine 10 mg qhs x 10 days to help with any muscle tension which may be contributing. I explained that this may cause drowsiness. She is not pregnant and not planning on conceiving at this time.\par -If symptoms persist, a trial of gabapentin 300 mg qhs can be considered.\par -Neck pain appears muscular in nature and is less severe than previously.\par \par f/u 1 month, sooner if needed.\par

## 2019-08-25 ENCOUNTER — FORM ENCOUNTER (OUTPATIENT)
Age: 29
End: 2019-08-25

## 2019-08-26 ENCOUNTER — APPOINTMENT (OUTPATIENT)
Dept: CT IMAGING | Facility: CLINIC | Age: 29
End: 2019-08-26
Payer: MEDICAID

## 2019-08-26 ENCOUNTER — OUTPATIENT (OUTPATIENT)
Dept: OUTPATIENT SERVICES | Facility: HOSPITAL | Age: 29
LOS: 1 days | End: 2019-08-26
Payer: MEDICAID

## 2019-08-26 DIAGNOSIS — Z00.8 ENCOUNTER FOR OTHER GENERAL EXAMINATION: ICD-10-CM

## 2019-08-26 PROCEDURE — 70450 CT HEAD/BRAIN W/O DYE: CPT

## 2019-08-26 PROCEDURE — 70450 CT HEAD/BRAIN W/O DYE: CPT | Mod: 26

## 2019-09-20 ENCOUNTER — APPOINTMENT (OUTPATIENT)
Dept: NEUROLOGY | Facility: CLINIC | Age: 29
End: 2019-09-20

## 2019-12-11 ENCOUNTER — EMERGENCY (EMERGENCY)
Facility: HOSPITAL | Age: 29
LOS: 0 days | Discharge: HOME | End: 2019-12-11
Attending: EMERGENCY MEDICINE
Payer: MEDICAID

## 2019-12-11 VITALS
OXYGEN SATURATION: 100 % | HEART RATE: 85 BPM | HEIGHT: 62 IN | WEIGHT: 139.99 LBS | RESPIRATION RATE: 16 BRPM | DIASTOLIC BLOOD PRESSURE: 71 MMHG | SYSTOLIC BLOOD PRESSURE: 135 MMHG | TEMPERATURE: 99 F

## 2019-12-11 DIAGNOSIS — R10.9 UNSPECIFIED ABDOMINAL PAIN: ICD-10-CM

## 2019-12-11 DIAGNOSIS — Z90.49 ACQUIRED ABSENCE OF OTHER SPECIFIED PARTS OF DIGESTIVE TRACT: Chronic | ICD-10-CM

## 2019-12-11 DIAGNOSIS — K21.9 GASTRO-ESOPHAGEAL REFLUX DISEASE WITHOUT ESOPHAGITIS: ICD-10-CM

## 2019-12-11 DIAGNOSIS — R10.13 EPIGASTRIC PAIN: ICD-10-CM

## 2019-12-11 LAB
ALBUMIN SERPL ELPH-MCNC: 4.2 G/DL — SIGNIFICANT CHANGE UP (ref 3.3–5)
ALP SERPL-CCNC: 85 U/L — SIGNIFICANT CHANGE UP (ref 40–120)
ALT FLD-CCNC: 17 U/L — SIGNIFICANT CHANGE UP (ref 12–78)
ANION GAP SERPL CALC-SCNC: 3 MMOL/L — LOW (ref 5–17)
AST SERPL-CCNC: 14 U/L — LOW (ref 15–37)
BASOPHILS # BLD AUTO: 0.04 K/UL — SIGNIFICANT CHANGE UP (ref 0–0.2)
BASOPHILS NFR BLD AUTO: 0.5 % — SIGNIFICANT CHANGE UP (ref 0–2)
BILIRUB SERPL-MCNC: 0.6 MG/DL — SIGNIFICANT CHANGE UP (ref 0.2–1.2)
BUN SERPL-MCNC: 11 MG/DL — SIGNIFICANT CHANGE UP (ref 7–23)
CALCIUM SERPL-MCNC: 9.4 MG/DL — SIGNIFICANT CHANGE UP (ref 8.5–10.1)
CHLORIDE SERPL-SCNC: 105 MMOL/L — SIGNIFICANT CHANGE UP (ref 96–108)
CO2 SERPL-SCNC: 29 MMOL/L — SIGNIFICANT CHANGE UP (ref 22–31)
CREAT SERPL-MCNC: 0.8 MG/DL — SIGNIFICANT CHANGE UP (ref 0.5–1.3)
EOSINOPHIL # BLD AUTO: 0.18 K/UL — SIGNIFICANT CHANGE UP (ref 0–0.5)
EOSINOPHIL NFR BLD AUTO: 2.5 % — SIGNIFICANT CHANGE UP (ref 0–6)
GLUCOSE SERPL-MCNC: 93 MG/DL — SIGNIFICANT CHANGE UP (ref 70–99)
HCT VFR BLD CALC: 37.5 % — SIGNIFICANT CHANGE UP (ref 34.5–45)
HGB BLD-MCNC: 11.9 G/DL — SIGNIFICANT CHANGE UP (ref 11.5–15.5)
IMM GRANULOCYTES NFR BLD AUTO: 0.3 % — SIGNIFICANT CHANGE UP (ref 0–1.5)
LIDOCAIN IGE QN: 241 U/L — SIGNIFICANT CHANGE UP (ref 73–393)
LYMPHOCYTES # BLD AUTO: 3.38 K/UL — HIGH (ref 1–3.3)
LYMPHOCYTES # BLD AUTO: 46.2 % — HIGH (ref 13–44)
MCHC RBC-ENTMCNC: 27.7 PG — SIGNIFICANT CHANGE UP (ref 27–34)
MCHC RBC-ENTMCNC: 31.7 GM/DL — LOW (ref 32–36)
MCV RBC AUTO: 87.2 FL — SIGNIFICANT CHANGE UP (ref 80–100)
MONOCYTES # BLD AUTO: 0.5 K/UL — SIGNIFICANT CHANGE UP (ref 0–0.9)
MONOCYTES NFR BLD AUTO: 6.8 % — SIGNIFICANT CHANGE UP (ref 2–14)
NEUTROPHILS # BLD AUTO: 3.2 K/UL — SIGNIFICANT CHANGE UP (ref 1.8–7.4)
NEUTROPHILS NFR BLD AUTO: 43.7 % — SIGNIFICANT CHANGE UP (ref 43–77)
PLATELET # BLD AUTO: 253 K/UL — SIGNIFICANT CHANGE UP (ref 150–400)
POTASSIUM SERPL-MCNC: 3.7 MMOL/L — SIGNIFICANT CHANGE UP (ref 3.5–5.3)
POTASSIUM SERPL-SCNC: 3.7 MMOL/L — SIGNIFICANT CHANGE UP (ref 3.5–5.3)
PROT SERPL-MCNC: 8.4 GM/DL — HIGH (ref 6–8.3)
RBC # BLD: 4.3 M/UL — SIGNIFICANT CHANGE UP (ref 3.8–5.2)
RBC # FLD: 15.2 % — HIGH (ref 10.3–14.5)
SODIUM SERPL-SCNC: 137 MMOL/L — SIGNIFICANT CHANGE UP (ref 135–145)
WBC # BLD: 7.32 K/UL — SIGNIFICANT CHANGE UP (ref 3.8–10.5)
WBC # FLD AUTO: 7.32 K/UL — SIGNIFICANT CHANGE UP (ref 3.8–10.5)

## 2019-12-11 PROCEDURE — 36415 COLL VENOUS BLD VENIPUNCTURE: CPT

## 2019-12-11 PROCEDURE — 99284 EMERGENCY DEPT VISIT MOD MDM: CPT

## 2019-12-11 PROCEDURE — 83690 ASSAY OF LIPASE: CPT

## 2019-12-11 PROCEDURE — 96374 THER/PROPH/DIAG INJ IV PUSH: CPT

## 2019-12-11 PROCEDURE — 85025 COMPLETE CBC W/AUTO DIFF WBC: CPT

## 2019-12-11 PROCEDURE — 96361 HYDRATE IV INFUSION ADD-ON: CPT

## 2019-12-11 PROCEDURE — 80053 COMPREHEN METABOLIC PANEL: CPT

## 2019-12-11 PROCEDURE — 99284 EMERGENCY DEPT VISIT MOD MDM: CPT | Mod: 25

## 2019-12-11 RX ORDER — ONDANSETRON 8 MG/1
4 TABLET, FILM COATED ORAL ONCE
Refills: 0 | Status: COMPLETED | OUTPATIENT
Start: 2019-12-11 | End: 2019-12-11

## 2019-12-11 RX ORDER — SODIUM CHLORIDE 9 MG/ML
1000 INJECTION INTRAMUSCULAR; INTRAVENOUS; SUBCUTANEOUS ONCE
Refills: 0 | Status: COMPLETED | OUTPATIENT
Start: 2019-12-11 | End: 2019-12-11

## 2019-12-11 RX ORDER — LIDOCAINE 4 G/100G
5 CREAM TOPICAL ONCE
Refills: 0 | Status: COMPLETED | OUTPATIENT
Start: 2019-12-11 | End: 2019-12-11

## 2019-12-11 RX ADMIN — Medication 30 MILLILITER(S): at 21:48

## 2019-12-11 RX ADMIN — ONDANSETRON 4 MILLIGRAM(S): 8 TABLET, FILM COATED ORAL at 21:48

## 2019-12-11 RX ADMIN — SODIUM CHLORIDE 2000 MILLILITER(S): 9 INJECTION INTRAMUSCULAR; INTRAVENOUS; SUBCUTANEOUS at 21:48

## 2019-12-11 RX ADMIN — SODIUM CHLORIDE 1000 MILLILITER(S): 9 INJECTION INTRAMUSCULAR; INTRAVENOUS; SUBCUTANEOUS at 22:52

## 2019-12-11 RX ADMIN — LIDOCAINE 5 MILLILITER(S): 4 CREAM TOPICAL at 21:48

## 2019-12-11 NOTE — ED STATDOCS - NSFOLLOWUPINSTRUCTIONS_ED_ALL_ED_FT
Please follow up with your primary care physician for further care and evaluation.  If testing was performed in the Emergency Department, please bring copies of all test results to your doctor.  Please continue to take all home medications as previously prescribed.    FOLLOW UP WITH YOUR SCHEDULED GASTROENTEROLOGY APPOINTMENT THIS SATURDAY    LIMIT SPICY FOODS    TAKE MAALOX WITH EACH MEAL, PER DOSING ON BOTTLE  TAKE PEPCID 20MG ONCE DAILY     Return to hospital for any new or concerning symptoms, including but not limited to: fevers, chills, nausea, vomiting, headache, dizziness, lightheadedness, chest pain, shortness of breath, difficulty breathing, worsening abdominal pain, black or bloody stools, weakness, or any other new or concerning symptoms.

## 2019-12-11 NOTE — ED STATDOCS - ATTENDING CONTRIBUTION TO CARE
I, Niranjan Puga, performed the initial face to face bedside interview with this patient regarding history of present illness, review of symptoms and relevant past medical, social and family history.  I completed an independent physical examination.  I was the initial provider who evaluated this patient. I have signed out the follow up of any pending tests (i.e. labs, radiological studies) to the Resident.  I have communicated the patient’s plan of care and disposition with the Resident.  The history, relevant review of systems, past medical and surgical history, medical decision making, and physical examination was documented by the scribe in my presence and I attest to the accuracy of the documentation.

## 2019-12-11 NOTE — ED STATDOCS - OBJECTIVE STATEMENT
28 y/o female with PMHx of gastritis, GERD on Omeprazole 40mg, s/p cholecystectomy 3 years ago presents to the ED c/o intermittent burning aching upper abd pain with minimal radiation across abd and to back. Pain aggravated immediately after PO intake. Episodes last 15 minutes. +assoc. nausea. Last BM was normal at 3pm. Seen by PCP 1 week ago who instructed pt to continue taking Omeprazole and Pepcid BID. Labs drawn which have not yet resulted. Instructed to avoid coffee, spicy foods by PCP. Denies V/D, black or bloody stool, fever, vaginal discharge, pain with intercourse, urinary sx. Took Gas-X with mild relief. 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 No EtOH. No illicit drug use. NKDA. No recent travel. No sick contacts. [facility] 11/27. Pt is currently sexually active. IUD place since 5/2019. PCP/Dr. Kaushal Rodrigues. GI/Dr. Valdez.

## 2019-12-11 NOTE — ED ADULT TRIAGE NOTE - CHIEF COMPLAINT QUOTE
intermittent upper abd pain x 1 week. worse before and after eating. Denies nausea, vomiting, diarrhea, fever/chills or urinary sx. LMP 11/28/19 Took omeprazole and pepcid with mild relief. intermittent upper abd pain radiating to back x 1 week. worse before and after eating. Denies nausea, vomiting, diarrhea, fever/chills or urinary sx. LMP 11/28/19 Took omeprazole and pepcid with mild relief.

## 2019-12-11 NOTE — ED STATDOCS - PATIENT PORTAL LINK FT
You can access the FollowMyHealth Patient Portal offered by [facility] by registering at the following website: [email or web address]. By joining [facility]’s FollowMyHealth portal, you will also be able to view your health information using other applications (apps) compatible with our system.

## 2019-12-11 NOTE — ED STATDOCS - PROGRESS NOTE DETAILS
29F, hx GERD/Gastritis presenting w chief complaint of 1 week epigastric pain radiating to right upper quadrant. Worse both before and after eating. Non positional, non exertional. Increased eructation over same time period. No fevers or chills, nausea or vomiting, headache, dizziness, lightheadedness, chest pain, shortness of breath, cough, lower abdominal pain, dysuria, hematuria, black/bloody stools, vaginal bleeding/discharge Last Bm today, normal.. Denies tobacco, ethanol, or drug use. Deneis hx of GI ulcers. Denies hx GI bleeds. LMP end of november. Is on chronic meds for gastritis. Patient is s/p cholecystectomy 3 yrs ago. Exam w tenderness to palpation to epigastric. No tenderness to palpation to right upper quadrant. Negative anthony. No lower abd tenderness to palpation. Higher clinical suspicion for gastritis as etiology of pain, lower clinical suspicion for retained stone, pancreatitis. Check labs, GI cocktail, re-assess.  Isidro Arrington MD, PGY3 Emergency Medicine Labs all wnl. patient resting in bed, no acute distress. States her sx have improved from time of arrival. patient has GI appt scheduled for Saturday. Will d/c home w return precautions, instructions to f/u  Isidro Arrington MD, PGY3 Emergency Medicine

## 2019-12-11 NOTE — ED STATDOCS - GASTROINTESTINAL, MLM
abdomen soft and non-distended. Bowel sounds present. +mild RUQ TTP. +midepigastric TTP. negative Rasheed's

## 2019-12-11 NOTE — ED ADULT NURSE NOTE - OBJECTIVE STATEMENT
c/o mid abd pain radiating to right flank since last week. Hx Gastritis & GERD. Saw PMD, prescribed omeprazole & Pepcid with little relief.

## 2019-12-11 NOTE — ED ADULT NURSE NOTE - CHIEF COMPLAINT QUOTE
intermittent upper abd pain radiating to back x 1 week. worse before and after eating. Denies nausea, vomiting, diarrhea, fever/chills or urinary sx. LMP 11/28/19 Took omeprazole and pepcid with mild relief.

## 2020-02-03 ENCOUNTER — TRANSCRIPTION ENCOUNTER (OUTPATIENT)
Age: 30
End: 2020-02-03

## 2020-07-24 ENCOUNTER — EMERGENCY (EMERGENCY)
Facility: HOSPITAL | Age: 30
LOS: 0 days | Discharge: HOME | End: 2020-07-25
Attending: EMERGENCY MEDICINE
Payer: MEDICAID

## 2020-07-24 VITALS — HEIGHT: 62 IN | WEIGHT: 147.93 LBS

## 2020-07-24 DIAGNOSIS — Z90.49 ACQUIRED ABSENCE OF OTHER SPECIFIED PARTS OF DIGESTIVE TRACT: Chronic | ICD-10-CM

## 2020-07-24 DIAGNOSIS — Z90.49 ACQUIRED ABSENCE OF OTHER SPECIFIED PARTS OF DIGESTIVE TRACT: ICD-10-CM

## 2020-07-24 DIAGNOSIS — R07.89 OTHER CHEST PAIN: ICD-10-CM

## 2020-07-24 DIAGNOSIS — R51 HEADACHE: ICD-10-CM

## 2020-07-24 LAB
APPEARANCE UR: CLEAR — SIGNIFICANT CHANGE UP
BASOPHILS # BLD AUTO: 0.03 K/UL — SIGNIFICANT CHANGE UP (ref 0–0.2)
BASOPHILS NFR BLD AUTO: 0.4 % — SIGNIFICANT CHANGE UP (ref 0–2)
BILIRUB UR-MCNC: NEGATIVE — SIGNIFICANT CHANGE UP
COLOR SPEC: YELLOW — SIGNIFICANT CHANGE UP
DIFF PNL FLD: ABNORMAL
EOSINOPHIL # BLD AUTO: 0.09 K/UL — SIGNIFICANT CHANGE UP (ref 0–0.5)
EOSINOPHIL NFR BLD AUTO: 1.2 % — SIGNIFICANT CHANGE UP (ref 0–6)
GLUCOSE UR QL: NEGATIVE MG/DL — SIGNIFICANT CHANGE UP
HCT VFR BLD CALC: 37.2 % — SIGNIFICANT CHANGE UP (ref 34.5–45)
HGB BLD-MCNC: 12.3 G/DL — SIGNIFICANT CHANGE UP (ref 11.5–15.5)
IMM GRANULOCYTES NFR BLD AUTO: 0.1 % — SIGNIFICANT CHANGE UP (ref 0–1.5)
KETONES UR-MCNC: NEGATIVE — SIGNIFICANT CHANGE UP
LEUKOCYTE ESTERASE UR-ACNC: NEGATIVE — SIGNIFICANT CHANGE UP
LYMPHOCYTES # BLD AUTO: 3.22 K/UL — SIGNIFICANT CHANGE UP (ref 1–3.3)
LYMPHOCYTES # BLD AUTO: 44.2 % — HIGH (ref 13–44)
MCHC RBC-ENTMCNC: 29.9 PG — SIGNIFICANT CHANGE UP (ref 27–34)
MCHC RBC-ENTMCNC: 33.1 GM/DL — SIGNIFICANT CHANGE UP (ref 32–36)
MCV RBC AUTO: 90.3 FL — SIGNIFICANT CHANGE UP (ref 80–100)
MONOCYTES # BLD AUTO: 0.51 K/UL — SIGNIFICANT CHANGE UP (ref 0–0.9)
MONOCYTES NFR BLD AUTO: 7 % — SIGNIFICANT CHANGE UP (ref 2–14)
NEUTROPHILS # BLD AUTO: 3.43 K/UL — SIGNIFICANT CHANGE UP (ref 1.8–7.4)
NEUTROPHILS NFR BLD AUTO: 47.1 % — SIGNIFICANT CHANGE UP (ref 43–77)
NITRITE UR-MCNC: NEGATIVE — SIGNIFICANT CHANGE UP
PH UR: 6.5 — SIGNIFICANT CHANGE UP (ref 5–8)
PLATELET # BLD AUTO: 207 K/UL — SIGNIFICANT CHANGE UP (ref 150–400)
PROT UR-MCNC: NEGATIVE MG/DL — SIGNIFICANT CHANGE UP
RBC # BLD: 4.12 M/UL — SIGNIFICANT CHANGE UP (ref 3.8–5.2)
RBC # FLD: 13.3 % — SIGNIFICANT CHANGE UP (ref 10.3–14.5)
SP GR SPEC: 1 — LOW (ref 1.01–1.02)
UROBILINOGEN FLD QL: NEGATIVE MG/DL — SIGNIFICANT CHANGE UP
WBC # BLD: 7.29 K/UL — SIGNIFICANT CHANGE UP (ref 3.8–10.5)
WBC # FLD AUTO: 7.29 K/UL — SIGNIFICANT CHANGE UP (ref 3.8–10.5)

## 2020-07-24 PROCEDURE — 81001 URINALYSIS AUTO W/SCOPE: CPT

## 2020-07-24 PROCEDURE — 96375 TX/PRO/DX INJ NEW DRUG ADDON: CPT

## 2020-07-24 PROCEDURE — 99284 EMERGENCY DEPT VISIT MOD MDM: CPT | Mod: 25

## 2020-07-24 PROCEDURE — 84702 CHORIONIC GONADOTROPIN TEST: CPT

## 2020-07-24 PROCEDURE — 84484 ASSAY OF TROPONIN QUANT: CPT

## 2020-07-24 PROCEDURE — 71045 X-RAY EXAM CHEST 1 VIEW: CPT | Mod: 26

## 2020-07-24 PROCEDURE — 83690 ASSAY OF LIPASE: CPT

## 2020-07-24 PROCEDURE — 99284 EMERGENCY DEPT VISIT MOD MDM: CPT

## 2020-07-24 PROCEDURE — 36415 COLL VENOUS BLD VENIPUNCTURE: CPT

## 2020-07-24 PROCEDURE — 70450 CT HEAD/BRAIN W/O DYE: CPT

## 2020-07-24 PROCEDURE — 71045 X-RAY EXAM CHEST 1 VIEW: CPT

## 2020-07-24 PROCEDURE — 80053 COMPREHEN METABOLIC PANEL: CPT

## 2020-07-24 PROCEDURE — 96374 THER/PROPH/DIAG INJ IV PUSH: CPT

## 2020-07-24 PROCEDURE — 93010 ELECTROCARDIOGRAM REPORT: CPT

## 2020-07-24 PROCEDURE — 85025 COMPLETE CBC W/AUTO DIFF WBC: CPT

## 2020-07-24 PROCEDURE — 70450 CT HEAD/BRAIN W/O DYE: CPT | Mod: 26

## 2020-07-24 PROCEDURE — U0003: CPT

## 2020-07-24 PROCEDURE — 93005 ELECTROCARDIOGRAM TRACING: CPT

## 2020-07-24 RX ORDER — ONDANSETRON 8 MG/1
4 TABLET, FILM COATED ORAL ONCE
Refills: 0 | Status: COMPLETED | OUTPATIENT
Start: 2020-07-24 | End: 2020-07-24

## 2020-07-24 RX ORDER — SODIUM CHLORIDE 9 MG/ML
1000 INJECTION INTRAMUSCULAR; INTRAVENOUS; SUBCUTANEOUS ONCE
Refills: 0 | Status: COMPLETED | OUTPATIENT
Start: 2020-07-24 | End: 2020-07-24

## 2020-07-24 RX ORDER — KETOROLAC TROMETHAMINE 30 MG/ML
30 SYRINGE (ML) INJECTION ONCE
Refills: 0 | Status: DISCONTINUED | OUTPATIENT
Start: 2020-07-24 | End: 2020-07-24

## 2020-07-24 RX ADMIN — SODIUM CHLORIDE 1000 MILLILITER(S): 9 INJECTION INTRAMUSCULAR; INTRAVENOUS; SUBCUTANEOUS at 23:10

## 2020-07-24 NOTE — ED ADULT NURSE NOTE - NSIMPLEMENTINTERV_GEN_ALL_ED
Implemented All Universal Safety Interventions:  [place] to call system. Call bell, personal items and telephone within reach. Instruct patient to call for assistance. Room bathroom lighting operational. Non-slip footwear when patient is off stretcher. Physically safe environment: no spills, clutter or unnecessary equipment. Stretcher in lowest position, wheels locked, appropriate side rails in place.

## 2020-07-24 NOTE — ED PROVIDER NOTE - NSFOLLOWUPINSTRUCTIONS_ED_ALL_ED_FT
please follow up with your doctors.   take motrin and tylenol for pain.   drink plenty of fluids.   return to ED for any concerns

## 2020-07-24 NOTE — ED PROVIDER NOTE - OBJECTIVE STATEMENT
29 y/o female in ED c/o intermittent frontal HA x 3 days and cp midsternal since this AM.   states took tylenol with minimal relief.   pt denies any fever, sob, n/v/d/abd pain.   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    no recent travel.   tolerating PO.

## 2020-07-24 NOTE — ED ADULT NURSE NOTE - OBJECTIVE STATEMENT
Pt presents to ED for chest pressure X 1 day. Pt also reports headache X 3 days. Pt took advil with not relief. Pt is tearful in ED and states she does not know if shes having a panic attack but reports she recently received news that shes RANJEET positive and nervous that she may have lupus. Pt denies arm and jaw pain. Denies fever, cough.

## 2020-07-24 NOTE — ED PROVIDER NOTE - CARE PLAN
Principal Discharge DX:	Nonintractable headache, unspecified chronicity pattern, unspecified headache type  Secondary Diagnosis:	Atypical chest pain

## 2020-07-24 NOTE — ED ADULT TRIAGE NOTE - CHIEF COMPLAINT QUOTE
Pt comes in for chest pressure that started a few minutes ago. Headache for 3 days as well. Denies actual pain. No cardiac hx.

## 2020-07-24 NOTE — ED PROVIDER NOTE - PATIENT PORTAL LINK FT
You can access the FollowMyHealth Patient Portal offered by [facility] by registering at the following website: [email or web address]. By joining [email or web address]’s FollowMyHealth portal, you will also be able to view your health information using other applications (apps) compatible with our system.

## 2020-07-25 VITALS
OXYGEN SATURATION: 100 % | RESPIRATION RATE: 19 BRPM | SYSTOLIC BLOOD PRESSURE: 122 MMHG | DIASTOLIC BLOOD PRESSURE: 66 MMHG | TEMPERATURE: 99 F | HEART RATE: 77 BPM

## 2020-07-25 LAB
ALBUMIN SERPL ELPH-MCNC: 4.3 G/DL — SIGNIFICANT CHANGE UP (ref 3.3–5)
ALP SERPL-CCNC: 76 U/L — SIGNIFICANT CHANGE UP (ref 40–120)
ALT FLD-CCNC: 21 U/L — SIGNIFICANT CHANGE UP (ref 12–78)
ANION GAP SERPL CALC-SCNC: 7 MMOL/L — SIGNIFICANT CHANGE UP (ref 5–17)
AST SERPL-CCNC: 15 U/L — SIGNIFICANT CHANGE UP (ref 15–37)
BILIRUB SERPL-MCNC: 0.7 MG/DL — SIGNIFICANT CHANGE UP (ref 0.2–1.2)
BUN SERPL-MCNC: 16 MG/DL — SIGNIFICANT CHANGE UP (ref 7–23)
CALCIUM SERPL-MCNC: 9 MG/DL — SIGNIFICANT CHANGE UP (ref 8.5–10.1)
CHLORIDE SERPL-SCNC: 105 MMOL/L — SIGNIFICANT CHANGE UP (ref 96–108)
CO2 SERPL-SCNC: 28 MMOL/L — SIGNIFICANT CHANGE UP (ref 22–31)
CREAT SERPL-MCNC: 0.83 MG/DL — SIGNIFICANT CHANGE UP (ref 0.5–1.3)
GLUCOSE SERPL-MCNC: 95 MG/DL — SIGNIFICANT CHANGE UP (ref 70–99)
HCG SERPL-ACNC: <1 MIU/ML — SIGNIFICANT CHANGE UP
LIDOCAIN IGE QN: 218 U/L — SIGNIFICANT CHANGE UP (ref 73–393)
POTASSIUM SERPL-MCNC: 3.4 MMOL/L — LOW (ref 3.5–5.3)
POTASSIUM SERPL-SCNC: 3.4 MMOL/L — LOW (ref 3.5–5.3)
PROT SERPL-MCNC: 8.3 GM/DL — SIGNIFICANT CHANGE UP (ref 6–8.3)
SODIUM SERPL-SCNC: 140 MMOL/L — SIGNIFICANT CHANGE UP (ref 135–145)
TROPONIN I SERPL-MCNC: <0.015 NG/ML — SIGNIFICANT CHANGE UP (ref 0.01–0.04)

## 2020-07-25 RX ADMIN — Medication 30 MILLIGRAM(S): at 00:53

## 2020-07-25 RX ADMIN — ONDANSETRON 4 MILLIGRAM(S): 8 TABLET, FILM COATED ORAL at 00:53

## 2020-08-03 ENCOUNTER — APPOINTMENT (OUTPATIENT)
Dept: NEUROLOGY | Facility: CLINIC | Age: 30
End: 2020-08-03
Payer: MEDICAID

## 2020-08-03 PROCEDURE — 99213 OFFICE O/P EST LOW 20 MIN: CPT | Mod: 95

## 2020-08-03 NOTE — DISCUSSION/SUMMARY
[FreeTextEntry1] : Ms. Holley is a 30 year old woman who was previously seen for headaches, now presenting with right sided radicular neck pain.\par \par Limited exam via televisit is unremarkable.\par -Previous imaging of brain showed tonsillar ectopia otherwise no significant intracranial pathology.\par \par Neck Pain\par -Possible cervical radiculopathy\par -In setting of tonsillar ectopia, would get MRI cervical spine to r/o any syringomyelia as well as herniated disc\par -May need PT following imaging\par -Can continue to use NSAIDs as needed. \par \par Episode of lightheadedness, chest tightness, sweating\par -Possible panic attack. Discussed relaxation exercises.\par \par f/u after MRI, sooner if needed\par

## 2020-08-03 NOTE — HISTORY OF PRESENT ILLNESS
[Home] : at home, [unfilled] , at the time of the visit. [Verbal consent obtained from patient] : the patient, [unfilled] [Medical Office: ([facility])___] : at the medical office located in  [FreeTextEntry1] : \par This is a telehealth visit that was performed with the originating site at the patient’s home and the distant site of my office at [address] [place] [address].\par Two way audio and visual technology was used. \par Verbal consent to participate in the telephone/video visit was obtained in lieu of in-person signature due to the coronavirus emergency.\par This particular visit occurred during the 2020 COVID-19 emergency. I discussed with the patient the nature of our telehealth visits, that:\par -I would evaluate the patient and recommend diagnostics and treatments based on my assessment.\par -Our sessions are not being recorded.\par -The patient acknowledged the risk of unsecure transmission of his/her information.\par -Our team would provide follow up care in person if/when the patient needs it.\par \par Ms. Calvo was last seen on 8/16/19.\par \par Today she is reporting right sided neck pain which radiates into the right shoulder.\par It has been present for about two months.\par She initially thought it was related to her pillow.\par She is not aware of any trauma.\par She rates the discomfort as a 7/10. It is worse with head movement.\par Massages have not helped. \par It is relieved with Naproxen.\par She denies having any weakness in her right arm.\par She does have tingling in her right hand/fingers and sometimes in the right calf (no pain in the lower extremity).\par \par She went to the hospital on 7/24. She felt lightheaded and her hands were sweaty.\par She had tightness in her chest. \par EKG was normal. CT scan showed tonsillar ectopia, unchanged from prior exam.\par \par She saw her PCP and was told that all of her bloodwork was normal.\par \par She had a well visit with her gynecologist and was told that she had a + RANJEET. \par She is not sure what the level was.

## 2020-08-03 NOTE — CONSULT LETTER
[Please see my note below.] : Please see my note below. [Dear  ___] : Dear  [unfilled], [Consult Letter:] : I had the pleasure of evaluating your patient, [unfilled]. [Consult Closing:] : Thank you very much for allowing me to participate in the care of this patient.  If you have any questions, please do not hesitate to contact me. [FreeTextEntry2] : Kaushal Chacko [FreeTextEntry3] : Sincerely,\par \par \par Taylor Marcos MD\par Diplomate, American Academy of Psychiatry and Neurology\par Board Certified in the Subspecialty of Clinical Neurophysiology\par Board Certified in the Subspecialty of Sleep Medicine\par Board Certified in the Subspecialty of Epilepsy\par

## 2020-08-03 NOTE — DATA REVIEWED
[de-identified] : CT head 8/26/19: No acute abnormality. MInimal 3-4 mm tonsillar ectopia\par \par CT head 7/24/20: No change in Chiari 1 malformation. If not already obtained, nonemergent MRI cervical spine to evaluate for cord syrinx would be helpful for complete assessment.\par No acute intracranial bleeding.\par  [de-identified] : MRI brain 7/21/18: No parenchymal abnormality. 4 mm tonsillar ectopia, an anatomic variant. Tiny retention cysts in the bilateral maxillary alveolar recesses, the largest measuring 1.6 cm on the right.

## 2020-08-11 NOTE — ED ADULT TRIAGE NOTE - CCCP TRG CHIEF CMPLNT
Patient missed 6 week follow up visit in [facility] today.  Message left on personalized voicemail to contact [facility] to reschedule.  Contact number provided   chest pressure

## 2020-08-19 ENCOUNTER — APPOINTMENT (OUTPATIENT)
Dept: MRI IMAGING | Facility: CLINIC | Age: 30
End: 2020-08-19
Payer: MEDICAID

## 2020-08-19 ENCOUNTER — OUTPATIENT (OUTPATIENT)
Dept: OUTPATIENT SERVICES | Facility: HOSPITAL | Age: 30
LOS: 1 days | End: 2020-08-19
Payer: MEDICAID

## 2020-08-19 DIAGNOSIS — Z00.8 ENCOUNTER FOR OTHER GENERAL EXAMINATION: ICD-10-CM

## 2020-08-19 DIAGNOSIS — Z90.49 ACQUIRED ABSENCE OF OTHER SPECIFIED PARTS OF DIGESTIVE TRACT: Chronic | ICD-10-CM

## 2020-08-19 PROCEDURE — 72141 MRI NECK SPINE W/O DYE: CPT

## 2020-08-19 PROCEDURE — 72141 MRI NECK SPINE W/O DYE: CPT | Mod: 26

## 2020-08-31 ENCOUNTER — APPOINTMENT (OUTPATIENT)
Dept: RHEUMATOLOGY | Facility: CLINIC | Age: 30
End: 2020-08-31

## 2020-10-22 ENCOUNTER — EMERGENCY (EMERGENCY)
Facility: HOSPITAL | Age: 30
LOS: 0 days | Discharge: HOME | End: 2020-10-22
Attending: EMERGENCY MEDICINE
Payer: MEDICAID

## 2020-10-22 VITALS
TEMPERATURE: 98 F | OXYGEN SATURATION: 100 % | DIASTOLIC BLOOD PRESSURE: 64 MMHG | HEART RATE: 75 BPM | RESPIRATION RATE: 18 BRPM | SYSTOLIC BLOOD PRESSURE: 115 MMHG

## 2020-10-22 VITALS — HEIGHT: 62 IN | WEIGHT: 141.98 LBS
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
## 2020-10-22 DIAGNOSIS — R51.9 HEADACHE, UNSPECIFIED: ICD-10-CM
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
## 2020-10-22 DIAGNOSIS — G93.5 COMPRESSION OF BRAIN: ICD-10-CM

## 2020-10-22 DIAGNOSIS — Z90.49 ACQUIRED ABSENCE OF OTHER SPECIFIED PARTS OF DIGESTIVE TRACT: Chronic | ICD-10-CM

## 2020-10-22 PROCEDURE — 70450 CT HEAD/BRAIN W/O DYE: CPT

## 2020-10-22 PROCEDURE — 99284 EMERGENCY DEPT VISIT MOD MDM: CPT | Mod: 25

## 2020-10-22 PROCEDURE — 96375 TX/PRO/DX INJ NEW DRUG ADDON: CPT

## 2020-10-22 PROCEDURE — 96374 THER/PROPH/DIAG INJ IV PUSH: CPT

## 2020-10-22 PROCEDURE — 99284 EMERGENCY DEPT VISIT MOD MDM: CPT

## 2020-10-22 PROCEDURE — 70450 CT HEAD/BRAIN W/O DYE: CPT | Mod: 26

## 2020-10-22 RX ORDER — KETOROLAC TROMETHAMINE 30 MG/ML
30 SYRINGE (ML) INJECTION ONCE
Refills: 0 | Status: DISCONTINUED | OUTPATIENT
Start: 2020-10-22 | End: 2020-10-22

## 2020-10-22 RX ORDER — METOCLOPRAMIDE HCL 10 MG
10 TABLET ORAL ONCE
Refills: 0 | Status: COMPLETED | OUTPATIENT
Start: 2020-10-22 | End: 2020-10-22

## 2020-10-22 RX ORDER — SODIUM CHLORIDE 9 MG/ML
1000 INJECTION INTRAMUSCULAR; INTRAVENOUS; SUBCUTANEOUS ONCE
Refills: 0 | Status: COMPLETED | OUTPATIENT
Start: 2020-10-22 | End: 2020-10-22

## 2020-10-22 RX ADMIN — Medication 30 MILLIGRAM(S): at 19:01

## 2020-10-22 RX ADMIN — Medication 10 MILLIGRAM(S): at 19:01

## 2020-10-22 RX ADMIN — SODIUM CHLORIDE 1000 MILLILITER(S): 9 INJECTION INTRAMUSCULAR; INTRAVENOUS; SUBCUTANEOUS at 19:01

## 2020-10-22 NOTE — ED STATDOCS - PATIENT PORTAL LINK FT
You can access the FollowMyHealth Patient Portal offered by [facility] by registering at the following website: [email or web address]. By joining [email or web address]’s FollowMyHealth portal, you will also be able to view your health information using other applications (apps) compatible with our system.

## 2020-10-22 NOTE — ED ADULT NURSE NOTE - OBJECTIVE STATEMENT
Pt states she has had a headache for about a week. Pt states she has had headaches before but never been diagnosed with migraines. Pt states it started over her eyes and forehead but moved to all over her head different places. Pt deneis n/v/d/f/c/cp/sob/numbness/tingling/photophobia/phonophobia. Pt A+Ox3 speech clear FREEMAN ambulates with steady gait.

## 2020-10-22 NOTE — ED STATDOCS - ENMT, MLM
No meningismus, Mouth with normal mucosa  Throat has no vesicles, no oropharyngeal exudates and uvula is midline.

## 2020-10-22 NOTE — ED STATDOCS - ATTENDING CONTRIBUTION TO CARE
I, Quita Valera MD, performed the initial face to face bedside interview with this patient regarding history of present illness, review of symptoms and relevant past medical, social and family history.  I completed an independent physical examination.  I was the initial provider who evaluated this patient. I have signed out the follow up of any pending tests (i.e. labs, radiological studies) to the ACP.  I have communicated the patient’s plan of care and disposition with the ACP.  The history, relevant review of systems, past medical and surgical history, medical decision making, and physical examination was documented by the scribe in my presence and I attest to the accuracy of the documentation.

## 2020-10-22 NOTE — ED STATDOCS - OBJECTIVE STATEMENT
31 y/o female with a PMHx of Chiari malformation, presents to the ED c/o headache. States she fell in the shower and hit her head on 10/17, took Motrin with mild relief. States headache is waxing and waning since fall and always has a headache when she wakes up in the morning. Describes headache as throbbing. Also with dental pain, f/u with the dentist. No hx of similar headache. Denies nausea, vomiting, vision changes.
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
## 2020-10-22 NOTE — ED STATDOCS - PROGRESS NOTE DETAILS
29 y/o F presents with HA x 1 week. Pt reports intermittent frontal HA mild-moderate in intensity, improved with motrin at home. Pt repots fall in the shower on 10/17 and the HA started the following morning. pt also reports feeling pressure in her sinuses. Denies fever/chills, n/v/d, congestions, cough, weakness, or other complaints at this time. -Alex Waggoner PA-C Results reviewed and discussed with pt. Pt reports improvement of pain with medications provided, discussed supportive measures with pt. She sees katerine Khan.  Pt asked to return to ED immediately for any new or concerning sx or worsening. Pt acknowledges and understands plan -Alex Waggoner PA-C

## 2020-10-23 ENCOUNTER — NON-APPOINTMENT (OUTPATIENT)
Age: 30
End: 2020-10-23

## 2020-11-09 ENCOUNTER — EMERGENCY (EMERGENCY)
Facility: HOSPITAL | Age: 30
LOS: 0 days | Discharge: HOME | End: 2020-11-09
Attending: EMERGENCY MEDICINE
Payer: COMMERCIAL

## 2020-11-09 VITALS
HEART RATE: 91 BPM | TEMPERATURE: 99 F | RESPIRATION RATE: 18 BRPM | OXYGEN SATURATION: 100 % | SYSTOLIC BLOOD PRESSURE: 115 MMHG | DIASTOLIC BLOOD PRESSURE: 72 MMHG

## 2020-11-09 VITALS — HEIGHT: 62 IN | WEIGHT: 119.93 LBS
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
## 2020-11-09 DIAGNOSIS — R51.9 HEADACHE, UNSPECIFIED: ICD-10-CM

## 2020-11-09 DIAGNOSIS — M54.9 DORSALGIA, UNSPECIFIED: ICD-10-CM
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
## 2020-11-09 DIAGNOSIS — Z90.49 ACQUIRED ABSENCE OF OTHER SPECIFIED PARTS OF DIGESTIVE TRACT: Chronic | ICD-10-CM

## 2020-11-09 PROCEDURE — 99283 EMERGENCY DEPT VISIT LOW MDM: CPT

## 2020-11-09 PROCEDURE — 99053 MED SERV 10PM-8AM 24 HR FAC: CPT

## 2020-11-09 RX ORDER — CYCLOBENZAPRINE HYDROCHLORIDE 10 MG/1
1 TABLET, FILM COATED ORAL
Qty: 28 | Refills: 0
Start: 2020-11-09 | End: 2020-11-15

## 2020-11-09 RX ORDER — ACETAMINOPHEN 500 MG
1000 TABLET ORAL ONCE
Refills: 0 | Status: COMPLETED | OUTPATIENT
Start: 2020-11-09 | End: 2020-11-09

## 2020-11-09 RX ADMIN — Medication 1000 MILLIGRAM(S): at 22:24

## 2020-11-09 NOTE — ED STATDOCS - PATIENT PORTAL LINK FT
You can access the FollowMyHealth Patient Portal offered by [facility] by registering at the following website: [email or web address]. By joining [employer]’s FollowMyHealth portal, you will also be able to view your health information using other applications (apps) compatible with our system.
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
## 2020-11-09 NOTE — ED STATDOCS - NEUROLOGICAL, MLM
Spoke with Dr. Avila regarding the patients e-message from 10/29/20. Dr. Avila stated that there is no correlation between the two. Called and spoke with the patient regarding this.   
sensation is normal and strength is normal.

## 2020-11-09 NOTE — ED STATDOCS - MUSCULOSKELETAL, MLM
range of motion is not limited and there is + muscle tenderness bilateral paraspinal cervical and lumbar regions.

## 2020-11-16 ENCOUNTER — APPOINTMENT (OUTPATIENT)
Dept: NEUROLOGY | Facility: CLINIC | Age: 30
End: 2020-11-16

## 2020-12-03 NOTE — PATIENT PROFILE OB - POST PARTUM DEPRESSION SCREEN OB 4
Patient Name: Dorys Negron  Specialist or PCP: Dr. Jimenez  Symptoms: Patient advised to follow up in two weeks from her facial lesion appointment today with Dr. Jimenez. There was nothing at the [place] location until 1/7, which is when she is scheduled. Patient declined scheduling anywhere else. Is there anything the patient should do in the meantime?   Best Availability: Any  Callback Number: [phone]    
Returned patient's call.  Patient states has been helped and scheduled for follow up.  Patient denies any further questions or concerns.    
no

## 2020-12-10 ENCOUNTER — APPOINTMENT (OUTPATIENT)
Dept: NEUROLOGY | Facility: CLINIC | Age: 30
End: 2020-12-10
Payer: MEDICAID

## 2020-12-10 VITALS
TEMPERATURE: 97.8 F | WEIGHT: 145 LBS | BODY MASS INDEX: 25.69 KG/M2 | SYSTOLIC BLOOD PRESSURE: 107 MMHG | HEART RATE: 71 BPM | DIASTOLIC BLOOD PRESSURE: 69 MMHG | HEIGHT: 63 IN

## 2020-12-10 PROCEDURE — 99072 ADDL SUPL MATRL&STAF TM PHE: CPT

## 2020-12-10 PROCEDURE — 99214 OFFICE O/P EST MOD 30 MIN: CPT

## 2020-12-10 NOTE — DATA REVIEWED
[de-identified] : MRI brain 7/21/18: No parenchymal abnormality. 4 mm tonsillar ectopia, an anatomic variant. Tiny retention cysts in the bilateral maxillary alveolar recesses, the largest measuring 1.6 cm on the right.  [de-identified] : CT head 8/26/19: No acute abnormality. MInimal 3-4 mm tonsillar ectopia\par \par CT head 7/24/20: No change in Chiari 1 malformation. If not already obtained, nonemergent MRI cervical spine to evaluate for cord syrinx would be helpful for complete assessment.\par No acute intracranial bleeding.\par \par \par MRI cervical spine 8/19/20:\par \par Chiari I malformation. No evidence of syrinx in the visualized spinal cord.\par \par Mild degenerative changes of the cervical spine at C4-C5, as above\par \par CT brain 10/22/20:\par  No evidence of an acute transcortical infarction or hemorrhage. Consider MRI of the brain as clinically warranted.\par

## 2020-12-10 NOTE — DISCUSSION/SUMMARY
[FreeTextEntry1] : Ms. Holley is a 30 year old woman with headaches and with right sided radicular neck pain.\par \par \par Neck Pain:\par -MRI cervical spine showing mild degenerative changes at C4-5. No evidence of syrinx\par -IContinue PT. Will refer to [facility]\par -Can continue to use NSAIDs as needed. \par -If symptoms do not improve can consider use of muscle relaxant at bedtime\par \par Headaches:\par -Had new headache suggestive of migraine without aura.\par -I am prescribing rizatriptan 10 mg to have on hand if she has another migraine that does not respond to ibuprofen.  She should take this as early as possible when the headache occurs and can repeat x 1 within 1-2 hours if necessary.\par We discussed common side effects.\par We discussed common migraine triggers. She should keep a headache diary in order to recognize any patterns/triggers.\par -If migraine type headaches are becoming more frequent can repeat MRI brain. However, current headaches do not sound as if they are secondary to Chiari 1 malformation.\par -Continue treatment of folliculitis.\par \par She also reports that her gynecologist told her she had a + RANJEET on bloodwork. Unclear what the titers were. Would f/u with primary care physician and determine if additional bloodwork is necessary.\par \par \par f/u 3-4 months, sooner if needed.\par \par \par

## 2020-12-10 NOTE — HISTORY OF PRESENT ILLNESS
[FreeTextEntry1] : I last saw Ms. Calvo via televisit on 8/3/20.\par \par She continues to experience neck pain.\par She was seeing physical therapy in [place] but the therapist moved to [place].\par She did find the therapy to be helpful.\par \par She wakes up in the AM with discomfort. She rates the pain as a 6/10. Her neck pain radiates into the right shoulder.\par She bought a new pillow without relief.\par She takes ibuprofen for the pain but not on a daily basis.\par \par She denies any weakness or numbness in her right arm.\par The tingling has resolved.\par \par In October she had an intense headache with nausea, photophobia and phonophobia.\par The symptoms were intermitent over 3-4 days and were not responsive to ibuprofen.\par She did not have an aura.\par She went to the ED where a CT scan was performed and she was prescribed muscle relaxants.\par She has a few headaches here and there since that time which are much milder.\par Sometimes she has some scalp tenderness. Her dermatologist told her she had folliculitis and is treating this with topical agents.\par \par

## 2021-01-22 ENCOUNTER — OUTPATIENT (OUTPATIENT)
Dept: OUTPATIENT SERVICES | Facility: HOSPITAL | Age: 31
LOS: 1 days | End: 2021-01-22
Payer: MEDICAID

## 2021-01-22 DIAGNOSIS — Z90.49 ACQUIRED ABSENCE OF OTHER SPECIFIED PARTS OF DIGESTIVE TRACT: Chronic | ICD-10-CM

## 2021-01-22 DIAGNOSIS — Z20.828 CONTACT WITH AND (SUSPECTED) EXPOSURE TO OTHER VIRAL COMMUNICABLE DISEASES: ICD-10-CM

## 2021-01-22 LAB — SARS-COV-2 RNA SPEC QL NAA+PROBE: SIGNIFICANT CHANGE UP

## 2021-01-22 PROCEDURE — U0003: CPT

## 2021-01-22 PROCEDURE — U0005: CPT

## 2021-01-22 PROCEDURE — C9803: CPT

## 2021-01-23 DIAGNOSIS — Z20.828 CONTACT WITH AND (SUSPECTED) EXPOSURE TO OTHER VIRAL COMMUNICABLE DISEASES: ICD-10-CM

## 2021-01-26 ENCOUNTER — OUTPATIENT (OUTPATIENT)
Dept: OUTPATIENT SERVICES | Facility: HOSPITAL | Age: 31
LOS: 1 days | End: 2021-01-26
Payer: MEDICAID

## 2021-01-26 DIAGNOSIS — Z90.49 ACQUIRED ABSENCE OF OTHER SPECIFIED PARTS OF DIGESTIVE TRACT: Chronic | ICD-10-CM

## 2021-01-26 DIAGNOSIS — Z20.828 CONTACT WITH AND (SUSPECTED) EXPOSURE TO OTHER VIRAL COMMUNICABLE DISEASES: ICD-10-CM

## 2021-01-26 LAB — SARS-COV-2 RNA SPEC QL NAA+PROBE: SIGNIFICANT CHANGE UP

## 2021-01-26 PROCEDURE — U0003: CPT

## 2021-01-26 PROCEDURE — C9803: CPT

## 2021-01-26 PROCEDURE — U0005: CPT

## 2021-01-27 ENCOUNTER — NON-APPOINTMENT (OUTPATIENT)
Age: 31
End: 2021-01-27

## 2021-01-27 ENCOUNTER — APPOINTMENT (OUTPATIENT)
Dept: FAMILY MEDICINE | Facility: CLINIC | Age: 31
End: 2021-01-27
Payer: MEDICAID

## 2021-01-27 VITALS
TEMPERATURE: 98 F | OXYGEN SATURATION: 100 % | BODY MASS INDEX: 25.16 KG/M2 | DIASTOLIC BLOOD PRESSURE: 73 MMHG | WEIGHT: 142 LBS | HEART RATE: 83 BPM | SYSTOLIC BLOOD PRESSURE: 112 MMHG | HEIGHT: 63 IN

## 2021-01-27 DIAGNOSIS — Z20.828 CONTACT WITH AND (SUSPECTED) EXPOSURE TO OTHER VIRAL COMMUNICABLE DISEASES: ICD-10-CM

## 2021-01-27 PROCEDURE — 99203 OFFICE O/P NEW LOW 30 MIN: CPT

## 2021-01-27 PROCEDURE — 99072 ADDL SUPL MATRL&STAF TM PHE: CPT

## 2021-01-27 RX ORDER — OMEPRAZOLE 20 MG/1
20 TABLET, DELAYED RELEASE ORAL
Refills: 0 | Status: DISCONTINUED | COMMUNITY
Start: 2018-07-10 | End: 2021-01-27

## 2021-01-27 NOTE — HEALTH RISK ASSESSMENT
[0-5] : 0-5 [1 or 2 (0 pts)] : 1 or 2 (0 points) [Never (0 pts)] : Never (0 points) [No] : In the past 12 months have you used drugs other than those required for medical reasons? No [0] : 2) Feeling down, depressed, or hopeless: Not at all (0) [No falls in past year] : Patient reported no falls in the past year [] : No [Audit-CScore] : 0 [de-identified] : none [de-identified] : could be better [[id number]] : 0

## 2021-01-27 NOTE — HISTORY OF PRESENT ILLNESS
[FreeTextEntry8] : Julia Calvo is a 30 year old female here to establish care. She also has complaint of body aches for over 1 year now. \par \par First noted symptoms 1.5 years ago. Has chronic neck stiffness (has hx of cervical spondylosis). Also has joint pains (shoulders, hands, wrists, knees, heel, low back, mid back.) Was told by a different doctor that her RANJEET was elevated and that she should see a rheumatologist. Denies any tick bites. No known fevers.

## 2021-01-28 ENCOUNTER — RESULT CHARGE (OUTPATIENT)
Age: 31
End: 2021-01-28

## 2021-01-28 LAB
BILIRUB UR QL STRIP: NORMAL
CLARITY UR: CLEAR
COLLECTION METHOD: NORMAL
GLUCOSE UR-MCNC: NORMAL
HCG UR QL: 0.2 EU/DL
HGB UR QL STRIP.AUTO: ABNORMAL
KETONES UR-MCNC: NORMAL
LEUKOCYTE ESTERASE UR QL STRIP: ABNORMAL
NITRITE UR QL STRIP: NORMAL
PH UR STRIP: 6
PROT UR STRIP-MCNC: NORMAL
SP GR UR STRIP: 1.1

## 2021-01-31 ENCOUNTER — OUTPATIENT (OUTPATIENT)
Dept: OUTPATIENT SERVICES | Facility: HOSPITAL | Age: 31
LOS: 1 days | End: 2021-01-31
Payer: MEDICAID

## 2021-01-31 DIAGNOSIS — Z90.49 ACQUIRED ABSENCE OF OTHER SPECIFIED PARTS OF DIGESTIVE TRACT: Chronic | ICD-10-CM

## 2021-01-31 DIAGNOSIS — Z20.828 CONTACT WITH AND (SUSPECTED) EXPOSURE TO OTHER VIRAL COMMUNICABLE DISEASES: ICD-10-CM

## 2021-01-31 LAB — SARS-COV-2 RNA SPEC QL NAA+PROBE: SIGNIFICANT CHANGE UP

## 2021-01-31 PROCEDURE — U0003: CPT

## 2021-01-31 PROCEDURE — U0005: CPT

## 2021-01-31 PROCEDURE — C9803: CPT

## 2021-02-01 DIAGNOSIS — Z20.828 CONTACT WITH AND (SUSPECTED) EXPOSURE TO OTHER VIRAL COMMUNICABLE DISEASES: ICD-10-CM

## 2021-02-23 ENCOUNTER — TRANSCRIPTION ENCOUNTER (OUTPATIENT)
Age: 31
End: 2021-02-23

## 2021-02-23 ENCOUNTER — APPOINTMENT (OUTPATIENT)
Dept: RHEUMATOLOGY | Facility: CLINIC | Age: 31
End: 2021-02-23
Payer: MEDICAID

## 2021-02-23 VITALS
OXYGEN SATURATION: 99 % | SYSTOLIC BLOOD PRESSURE: 100 MMHG | RESPIRATION RATE: 17 BRPM | BODY MASS INDEX: 25.69 KG/M2 | HEART RATE: 78 BPM | HEIGHT: 63 IN | TEMPERATURE: 97.3 F | DIASTOLIC BLOOD PRESSURE: 72 MMHG | WEIGHT: 145 LBS

## 2021-02-23 DIAGNOSIS — Z82.61 FAMILY HISTORY OF ARTHRITIS: ICD-10-CM

## 2021-02-23 PROCEDURE — 99072 ADDL SUPL MATRL&STAF TM PHE: CPT

## 2021-02-23 PROCEDURE — 99205 OFFICE O/P NEW HI 60 MIN: CPT

## 2021-02-23 NOTE — PHYSICAL EXAM
[General Appearance - Alert] : alert [General Appearance - In No Acute Distress] : in no acute distress [Sclera] : the sclera and conjunctiva were normal [Outer Ear] : the ears and nose were normal in appearance [Oropharynx] : the oropharynx was normal [Neck Appearance] : the appearance of the neck was normal [Neck Cervical Mass (___cm)] : no neck mass was observed [Jugular Venous Distention Increased] : there was no jugular-venous distention [Thyroid Diffuse Enlargement] : the thyroid was not enlarged [Thyroid Nodule] : there were no palpable thyroid nodules [Heart Rate And Rhythm] : heart rate was normal and rhythm regular [Auscultation Breath Sounds / Voice Sounds] : lungs were clear to auscultation bilaterally [Heart Sounds] : normal S1 and S2 [Heart Sounds Gallop] : no gallops [Murmurs] : no murmurs [Heart Sounds Pericardial Friction Rub] : no pericardial rub [Edema] : there was no peripheral edema [Bowel Sounds] : normal bowel sounds [Abdomen Soft] : soft [Abdomen Tenderness] : non-tender [Abdomen Mass (___ Cm)] : no abdominal mass palpated [Cervical Lymph Nodes Enlarged Posterior Bilaterally] : posterior cervical [Cervical Lymph Nodes Enlarged Anterior Bilaterally] : anterior cervical [Supraclavicular Lymph Nodes Enlarged Bilaterally] : supraclavicular [Skin Color & Pigmentation] : normal skin color and pigmentation [Skin Turgor] : normal skin turgor [] : no rash [No Focal Deficits] : no focal deficits [Oriented To Time, Place, And Person] : oriented to person, place, and time [Impaired Insight] : insight and judgment were intact [Affect] : the affect was normal [FreeTextEntry1] : no joint swelling;  (+)tenderness in all B/L MCP's, PIP's, wrists;  B/L shoulders w/ pain upon abduction, internal rotation

## 2021-02-23 NOTE — HISTORY OF PRESENT ILLNESS
[Arthralgias] : arthralgias [FreeTextEntry1] : 31 year old female with PMHx as listed below reports that she has been experiencing joint pains, including her shoulders, neck, and knees for the past year.  The pain is intermittent, occurring 2-3 times per week, lasting 10-15 minutes at a time.  During episodes, the pain occurs both with activity and at rest.  She describes the pain as achy, 5-6 out of 10. She denies any joint swelling or AM stiffness.  She gets some relief from Advil and Flexeril.  No other known alleviating factors.  Of note, she was recently found to have a positive RANJEET.\par No F/C, no unintentional weight loss, no night sweats, no oral ulcers, no rashes, no alopecia, no photosensitivity, no dry eyes/dry mouth, no Raynaud symptoms, no focal weakness, no dysphagia  [Anorexia] : no anorexia [Weight Loss] : no weight loss [Malaise] : no malaise [Chills] : no chills [Fever] : no fever [Fatigue] : no fatigue [Malar Facial Rash] : no malar facial rash [Skin Lesions] : no lesions [Skin Nodules] : no skin nodules [Oral Ulcers] : no oral ulcers [Cough] : no cough [Dry Mouth] : no dry mouth [Dysphonia] : no dysphonia [Dysphagia] : no dysphagia [Shortness of Breath] : no shortness of breath [Joint Swelling] : no joint swelling [Chest Pain] : no chest pain [Joint Warmth] : no joint warmth [Joint Deformity] : no joint deformity [Decreased ROM] : no decreased range of motion [Morning Stiffness] : no morning stiffness [Falls] : no falls [Difficulty Standing] : no difficulty standing [Difficulty Walking] : no difficulty walking [Dyspnea] : no dyspnea [Myalgias] : no myalgias [Muscle Weakness] : no muscle weakness [Muscle Spasms] : no muscle spasms [Muscle Cramping] : no muscle cramping [Visual Changes] : no visual changes [Eye Pain] : no eye pain [Eye Redness] : no eye redness [Dry Eyes] : no dry eyes

## 2021-02-23 NOTE — DATA REVIEWED
[FreeTextEntry1] : CBC, CMP unremarkable\par (+)RANJEET\par RF negative\par Lyme negative\par uric acid 5.6\par ESR 39\par CRP <1mg/L\par UA:  no blood, no protein\par TSH 1.57

## 2021-02-23 NOTE — ASSESSMENT
[FreeTextEntry1] : 31 year old female presents with polyarticular arthritis and myalgias of unclear etiology.  While she was found to have a positive RANJEET, she does not exhibit any obvious signs/symptoms of an underlying connective tissue disorder, other than dry eyes which can occur in Sjogren's Syndrome but is very non-specific.  I have therefore ordered some more bloodwork, including serologies, as further workup.  She will follow up with me again in 2-3 weeks to review her results.  In the meantime, she will continue taking Advil to help alleviate her symptoms.

## 2021-03-18 ENCOUNTER — APPOINTMENT (OUTPATIENT)
Dept: RHEUMATOLOGY | Facility: CLINIC | Age: 31
End: 2021-03-18

## 2021-03-25 ENCOUNTER — APPOINTMENT (OUTPATIENT)
Dept: NEUROLOGY | Facility: CLINIC | Age: 31
End: 2021-03-25

## 2021-04-01 ENCOUNTER — OUTPATIENT (OUTPATIENT)
Dept: OUTPATIENT SERVICES | Facility: HOSPITAL | Age: 31
LOS: 1 days | End: 2021-04-01
Payer: MEDICAID

## 2021-04-01 ENCOUNTER — APPOINTMENT (OUTPATIENT)
Dept: ULTRASOUND IMAGING | Facility: CLINIC | Age: 31
End: 2021-04-01
Payer: MEDICAID

## 2021-04-01 ENCOUNTER — APPOINTMENT (OUTPATIENT)
Dept: FAMILY MEDICINE | Facility: CLINIC | Age: 31
End: 2021-04-01
Payer: MEDICAID

## 2021-04-01 VITALS
BODY MASS INDEX: 26.22 KG/M2 | OXYGEN SATURATION: 98 % | TEMPERATURE: 98 F | HEART RATE: 66 BPM | SYSTOLIC BLOOD PRESSURE: 117 MMHG | DIASTOLIC BLOOD PRESSURE: 75 MMHG | WEIGHT: 148 LBS

## 2021-04-01 DIAGNOSIS — Z90.49 ACQUIRED ABSENCE OF OTHER SPECIFIED PARTS OF DIGESTIVE TRACT: Chronic | ICD-10-CM

## 2021-04-01 DIAGNOSIS — O00.109 UNSPECIFIED TUBAL PREGNANCY WITHOUT INTRAUTERINE PREGNANCY: ICD-10-CM

## 2021-04-01 DIAGNOSIS — Z00.8 ENCOUNTER FOR OTHER GENERAL EXAMINATION: ICD-10-CM

## 2021-04-01 PROCEDURE — 76770 US EXAM ABDO BACK WALL COMP: CPT | Mod: 26

## 2021-04-01 PROCEDURE — 76770 US EXAM ABDO BACK WALL COMP: CPT

## 2021-04-01 PROCEDURE — 99213 OFFICE O/P EST LOW 20 MIN: CPT | Mod: 25

## 2021-04-01 PROCEDURE — 99072 ADDL SUPL MATRL&STAF TM PHE: CPT

## 2021-04-01 PROCEDURE — 81003 URINALYSIS AUTO W/O SCOPE: CPT | Mod: QW

## 2021-04-01 RX ORDER — RIZATRIPTAN BENZOATE 10 MG/1
10 TABLET, ORALLY DISINTEGRATING ORAL
Qty: 8 | Refills: 5 | Status: DISCONTINUED | COMMUNITY
Start: 2020-12-10 | End: 2021-04-01

## 2021-04-01 NOTE — ASSESSMENT
[FreeTextEntry1] : differential: \par r/o UTI if negative \par r/o pregnancy, ovarian cyst, ectopic, stone

## 2021-04-01 NOTE — HISTORY OF PRESENT ILLNESS
[FreeTextEntry8] : Julia Calvo is a 31 year old female presenting with left sided flank pain starting night prior 7/10, no pain currently. Lasted 1-2 hours intermittently. No fevers. +Chills. Radiation to LLQ. Denies any heavy lifting or activity at the time. Admits to urgency and frequency but also admits that she has been drinking a lot of water. States similar pain occurred about 2 months prior, she went to her gyn and had an US done to make sure her IUD was in place. Results were normal and pain resolved spontaneously. Denies any diarrhea or constipation.

## 2021-04-01 NOTE — PHYSICAL EXAM
[Normal] : affect was normal and insight and judgment were intact [de-identified] : no CVA tenderness

## 2021-04-02 LAB
BILIRUB UR QL STRIP: NORMAL
CLARITY UR: CLEAR
COLLECTION METHOD: NORMAL
GLUCOSE UR-MCNC: NORMAL
HCG UR QL: 0.2 EU/DL
HGB UR QL STRIP.AUTO: ABNORMAL
KETONES UR-MCNC: NORMAL
LEUKOCYTE ESTERASE UR QL STRIP: NORMAL
NITRITE UR QL STRIP: NORMAL
PH UR STRIP: 6
PROT UR STRIP-MCNC: NORMAL
SP GR UR STRIP: 1.02

## 2021-04-30 NOTE — ED STATDOCS - NS ED MD DISPO DISCHARGE CCDA
Obtain Level of Care/Service Not Available at this Facility
Patient/Caregiver provided printed discharge information.

## 2021-05-04 NOTE — ED ADULT TRIAGE NOTE - NSWEIGHTCALCTOOLDRUG_GEN_A_CORE
BETTINA Garrett  You 15 minutes ago (2:19 PM)     I would advise him to contact his PCP since we don't prescribe pain medication prior to surgery.  We can also refer him to [facility] if he would like.         Writer returned call to patient to inform him of the above. He states he will contact his PCP but also requested a pain management referral. Will place order today. Patient verbalized understanding and appreciated the call.     used

## 2021-06-09 ENCOUNTER — APPOINTMENT (OUTPATIENT)
Dept: FAMILY MEDICINE | Facility: CLINIC | Age: 31
End: 2021-06-09
Payer: MEDICAID

## 2021-06-09 ENCOUNTER — NON-APPOINTMENT (OUTPATIENT)
Age: 31
End: 2021-06-09

## 2021-06-09 ENCOUNTER — RESULT CHARGE (OUTPATIENT)
Age: 31
End: 2021-06-09

## 2021-06-09 VITALS
OXYGEN SATURATION: 99 % | TEMPERATURE: 98.1 F | HEART RATE: 75 BPM | BODY MASS INDEX: 25.87 KG/M2 | WEIGHT: 146 LBS | DIASTOLIC BLOOD PRESSURE: 70 MMHG | SYSTOLIC BLOOD PRESSURE: 103 MMHG | HEIGHT: 63 IN

## 2021-06-09 DIAGNOSIS — Z87.898 PERSONAL HISTORY OF OTHER SPECIFIED CONDITIONS: ICD-10-CM

## 2021-06-09 DIAGNOSIS — R39.9 UNSPECIFIED SYMPTOMS AND SIGNS INVOLVING THE GENITOURINARY SYSTEM: ICD-10-CM

## 2021-06-09 DIAGNOSIS — Z87.39 PERSONAL HISTORY OF OTHER DISEASES OF THE MUSCULOSKELETAL SYSTEM AND CONNECTIVE TISSUE: ICD-10-CM

## 2021-06-09 LAB
BILIRUB UR QL STRIP: NORMAL
CLARITY UR: CLEAR
COLLECTION METHOD: NORMAL
GLUCOSE UR-MCNC: NORMAL
HCG UR QL: 0.2 EU/DL
HGB UR QL STRIP.AUTO: ABNORMAL
KETONES UR-MCNC: NORMAL
LEUKOCYTE ESTERASE UR QL STRIP: NORMAL
NITRITE UR QL STRIP: NORMAL
PH UR STRIP: 6
PROT UR STRIP-MCNC: NORMAL
SP GR UR STRIP: 1.01

## 2021-06-09 PROCEDURE — 99173 VISUAL ACUITY SCREEN: CPT

## 2021-06-09 PROCEDURE — 81003 URINALYSIS AUTO W/O SCOPE: CPT | Mod: QW

## 2021-06-09 PROCEDURE — 99395 PREV VISIT EST AGE 18-39: CPT | Mod: 25

## 2021-06-09 PROCEDURE — 92551 PURE TONE HEARING TEST AIR: CPT

## 2021-06-09 NOTE — PHYSICAL EXAM

## 2021-06-09 NOTE — HEALTH RISK ASSESSMENT
[0-5] : 0-5 [Yes] : Yes [Monthly or less (1 pt)] : Monthly or less (1 point) [1 or 2 (0 pts)] : 1 or 2 (0 points) [Never (0 pts)] : Never (0 points) [No] : In the past 12 months have you used drugs other than those required for medical reasons? No [No falls in past year] : Patient reported no falls in the past year [0] : 2) Feeling down, depressed, or hopeless: Not at all (0) [HIV test declined] : HIV test declined [Hepatitis C test declined] : Hepatitis C test declined [With Family] : lives with family [# of Members in Household ___] :  household currently consist of [unfilled] member(s) [Employed] : employed [College] : College [] :  [# Of Children ___] : has [unfilled] children [Sexually Active] : sexually active [High Risk Behavior] : high risk behavior [Feels Safe at Home] : Feels safe at home [Fully functional (bathing, dressing, toileting, transferring, walking, feeding)] : Fully functional (bathing, dressing, toileting, transferring, walking, feeding) [Fully functional (using the telephone, shopping, preparing meals, housekeeping, doing laundry, using] : Fully functional and needs no help or supervision to perform IADLs (using the telephone, shopping, preparing meals, housekeeping, doing laundry, using transportation, managing medications and managing finances) [Reports normal functional visual acuity (ie: able to read med bottle)] : Reports normal functional visual acuity [Smoke Detector] : smoke detector [Carbon Monoxide Detector] : carbon monoxide detector [Safety elements used in home] : safety elements used in home [Seat Belt] :  uses seat belt [Sunscreen] : uses sunscreen [Very Good] : ~his/her~  mood as very good [Patient reported PAP Smear was normal] : Patient reported PAP Smear was normal [None] : None [] : No [Audit-CScore] : 1 [[id number]] : 0 [Change in mental status noted] : No change in mental status noted [Language] : denies difficulty with language [Behavior] : denies difficulty with behavior [Learning/Retaining New Information] : denies difficulty learning/retaining new information [Handling Complex Tasks] : denies difficulty handling complex tasks [Reasoning] : denies difficulty with reasoning [Spatial Ability and Orientation] : denies difficulty with spatial ability and orientation [Reports changes in hearing] : Reports no changes in hearing [Reports changes in vision] : Reports no changes in vision [Reports changes in dental health] : Reports no changes in dental health [Guns at Home] : no guns at home [Travel to Developing Areas] : does not  travel to developing areas [TB Exposure] : is not being exposed to tuberculosis [Caregiver Concerns] : does not have caregiver concerns [PapSmearDate] : 01/18

## 2021-06-10 NOTE — ED ADULT NURSE NOTE - CAS EDP DISCH TYPE
M-Plasty Intermediate Repair Preamble Text (Leave Blank If You Do Not Want): Undermining was performed with blunt dissection. Home

## 2021-06-11 ENCOUNTER — TRANSCRIPTION ENCOUNTER (OUTPATIENT)
Age: 31
End: 2021-06-11

## 2021-06-25 ENCOUNTER — NON-APPOINTMENT (OUTPATIENT)
Age: 31
End: 2021-06-25

## 2021-07-22 ENCOUNTER — RX RENEWAL (OUTPATIENT)
Age: 31
End: 2021-07-22

## 2021-07-22 ENCOUNTER — APPOINTMENT (OUTPATIENT)
Dept: NEUROLOGY | Facility: CLINIC | Age: 31
End: 2021-07-22
Payer: MEDICAID

## 2021-07-22 VITALS
HEIGHT: 63 IN | SYSTOLIC BLOOD PRESSURE: 110 MMHG | WEIGHT: 144 LBS | BODY MASS INDEX: 25.52 KG/M2 | TEMPERATURE: 97.8 F | HEART RATE: 74 BPM | DIASTOLIC BLOOD PRESSURE: 64 MMHG

## 2021-07-22 PROCEDURE — 99213 OFFICE O/P EST LOW 20 MIN: CPT

## 2021-07-22 NOTE — DISCUSSION/SUMMARY
[FreeTextEntry1] : Ms. Holley is a 30 year old woman with headaches and with right sided radicular neck pain.\par \par \par Neck Pain:\par -MRI cervical spine showing mild degenerative changes at C4-5. No evidence of syrinx\par -Improved with PT\par -Can continue to use NSAIDs as needed. \par -If symptoms worsen in the future can try muscle relaxants.\par \par Headaches:\par -Had new headache suggestive of migraine without aura but no recent episodes.\par -Can use rizatriptan 10 mg if migraine occurs again\par -Has Arnold Chiari malformation which does not seem to be causing problems at this time. If she develops persistent posterior headaches would reassess.\par \par f/u 6 months if needed

## 2021-07-22 NOTE — HISTORY OF PRESENT ILLNESS
[FreeTextEntry1] : I last saw Ms. Calvo on 12/10/20.\par \par She went to PT for two months.\par Her neck pain has improved.\par \par She has not had any recent severe headaches.\par She has not used the rizatriptan because she has not needed it.\par \par She has some mild posterior headaches.\par \par She saw a rheumatologist for a positive RANJEET.\par A repeat RANJEET was negative. Sjogren's Antibodies were also negative.\par She told that there is nothing to worry about.\par \par She did see ENT about 2-3 months ago.\par

## 2021-07-22 NOTE — DATA REVIEWED
[de-identified] : MRI brain 7/21/18: No parenchymal abnormality. 4 mm tonsillar ectopia, an anatomic variant. Tiny retention cysts in the bilateral maxillary alveolar recesses, the largest measuring 1.6 cm on the right.  [de-identified] : CT head 8/26/19: No acute abnormality. MInimal 3-4 mm tonsillar ectopia\par \par CT head 7/24/20: No change in Chiari 1 malformation. If not already obtained, nonemergent MRI cervical spine to evaluate for cord syrinx would be helpful for complete assessment.\par No acute intracranial bleeding.\par \par \par MRI cervical spine 8/19/20:\par \par Chiari I malformation. No evidence of syrinx in the visualized spinal cord.\par \par Mild degenerative changes of the cervical spine at C4-C5, as above

## 2021-08-20 ENCOUNTER — APPOINTMENT (OUTPATIENT)
Dept: FAMILY MEDICINE | Facility: CLINIC | Age: 31
End: 2021-08-20
Payer: MEDICAID

## 2021-08-20 VITALS
OXYGEN SATURATION: 99 % | BODY MASS INDEX: 25.52 KG/M2 | HEART RATE: 78 BPM | WEIGHT: 144 LBS | HEIGHT: 63 IN | DIASTOLIC BLOOD PRESSURE: 70 MMHG | TEMPERATURE: 98.4 F | SYSTOLIC BLOOD PRESSURE: 110 MMHG

## 2021-08-20 PROCEDURE — 99213 OFFICE O/P EST LOW 20 MIN: CPT

## 2021-08-20 NOTE — PHYSICAL EXAM
[Normal] : no acute distress, well nourished, well developed and well-appearing [No Edema] : there was no peripheral edema [de-identified] : superficial varicosities both legs not tender to palpation [de-identified] : FROM mild calf tenderness. No palp cords, no Homans sign. No tenderness to ap or lat pressure

## 2021-08-20 NOTE — HISTORY OF PRESENT ILLNESS
[FreeTextEntry8] : pt is here for back leg pain\par also has a prominent vein on that side\par No swelling or reddness

## 2021-08-29 LAB — SARS-COV-2 N GENE NPH QL NAA+PROBE: NOT DETECTED

## 2021-09-24 NOTE — ED ADULT NURSE NOTE - TEMPLATE
DISPLAY PLAN FREE TEXT DISPLAY PLAN FREE TEXT DISPLAY PLAN FREE TEXT DISPLAY PLAN FREE TEXT DISPLAY PLAN FREE TEXT DISPLAY PLAN FREE TEXT DISPLAY PLAN FREE TEXT Neuro

## 2021-11-15 ENCOUNTER — APPOINTMENT (OUTPATIENT)
Dept: VASCULAR SURGERY | Facility: CLINIC | Age: 31
End: 2021-11-15
Payer: MEDICAID

## 2021-11-15 VITALS
HEART RATE: 81 BPM | DIASTOLIC BLOOD PRESSURE: 71 MMHG | SYSTOLIC BLOOD PRESSURE: 110 MMHG | OXYGEN SATURATION: 99 % | TEMPERATURE: 98.3 F

## 2021-11-15 PROCEDURE — 99203 OFFICE O/P NEW LOW 30 MIN: CPT

## 2021-11-15 PROCEDURE — 93970 EXTREMITY STUDY: CPT

## 2021-11-15 NOTE — PHYSICAL EXAM
[JVD] : no jugular venous distention  [Normal Breath Sounds] : Normal breath sounds [Normal Heart Sounds] : normal heart sounds [2+] : left 2+ [Ankle Swelling (On Exam)] : not present [Varicose Veins Of Lower Extremities] : bilaterally [Ankle Swelling On The Right] : mild [] : not present [Abdomen Masses] : No abdominal masses [Abdomen Tenderness] : ~T ~M No abdominal tenderness [Alert] : alert [Oriented to Person] : oriented to person [Oriented to Place] : oriented to place [Calm] : calm [de-identified] : Well appearing [de-identified] : NCAT

## 2021-11-15 NOTE — ASSESSMENT
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 There is also deep reflux in the right femoral vein\par She has failed conservative management strategies including compression stockings and exercise.\par I have discussed the option of GSV RF ablation. We have discussed the risks and benefits of the planned procedure and she will like to proceed.\par Will schedule for bilateral staged GSV ablation, right side first\par

## 2021-11-15 NOTE — HISTORY OF PRESENT ILLNESS
[FreeTextEntry1] : 31 year old female presents for evaluation of bilateral lower extremity pain. Symptoms have worsened over the last year. She complains of dull aching with standing or sitting, heaviness in her legs and itchiness of the veins around her ankle. She also has ankle swelling that worsens through the day. Symptoms are worse in her right leg.\par She regularly wears compression stockings without improvement in symptoms.\par No history of DVT.\par She denies claudication, rest pain or leg ulcers\par

## 2021-11-22 ENCOUNTER — RX RENEWAL (OUTPATIENT)
Age: 31
End: 2021-11-22

## 2021-11-24 ENCOUNTER — RX RENEWAL (OUTPATIENT)
Age: 31
End: 2021-11-24

## 2021-12-15 ENCOUNTER — EMERGENCY (EMERGENCY)
Facility: HOSPITAL | Age: 31
LOS: 0 days | Discharge: HOME | End: 2021-12-15
Attending: EMERGENCY MEDICINE
Payer: MEDICAID

## 2021-12-15 VITALS
DIASTOLIC BLOOD PRESSURE: 59 MMHG | HEART RATE: 72 BPM | OXYGEN SATURATION: 100 % | SYSTOLIC BLOOD PRESSURE: 112 MMHG | RESPIRATION RATE: 16 BRPM | TEMPERATURE: 99 F

## 2021-12-15 VITALS — WEIGHT: 141.1 LBS | HEIGHT: 62 IN

## 2021-12-15 DIAGNOSIS — R10.13 EPIGASTRIC PAIN: ICD-10-CM

## 2021-12-15 DIAGNOSIS — Z90.49 ACQUIRED ABSENCE OF OTHER SPECIFIED PARTS OF DIGESTIVE TRACT: Chronic | ICD-10-CM

## 2021-12-15 DIAGNOSIS — Z90.49 ACQUIRED ABSENCE OF OTHER SPECIFIED PARTS OF DIGESTIVE TRACT: ICD-10-CM

## 2021-12-15 DIAGNOSIS — Z87.442 PERSONAL HISTORY OF URINARY CALCULI: ICD-10-CM

## 2021-12-15 PROCEDURE — 99283 EMERGENCY DEPT VISIT LOW MDM: CPT

## 2021-12-15 RX ORDER — LIDOCAINE 4 G/100G
10 CREAM TOPICAL ONCE
Refills: 0 | Status: DISCONTINUED | OUTPATIENT
Start: 2021-12-15 | End: 2021-12-15

## 2021-12-15 RX ORDER — ACETAMINOPHEN 500 MG
650 TABLET ORAL ONCE
Refills: 0 | Status: COMPLETED | OUTPATIENT
Start: 2021-12-15 | End: 2021-12-15

## 2021-12-15 RX ORDER — DIPHENHYDRAMINE HYDROCHLORIDE AND LIDOCAINE HYDROCHLORIDE AND ALUMINUM HYDROXIDE AND MAGNESIUM HYDRO
5 KIT ONCE
Refills: 0 | Status: COMPLETED | OUTPATIENT
Start: 2021-12-15 | End: 2021-12-15

## 2021-12-15 RX ADMIN — DIPHENHYDRAMINE HYDROCHLORIDE AND LIDOCAINE HYDROCHLORIDE AND ALUMINUM HYDROXIDE AND MAGNESIUM HYDRO 5 MILLILITER(S): KIT at 23:05

## 2021-12-15 RX ADMIN — Medication 650 MILLIGRAM(S): at 23:04

## 2021-12-15 NOTE — ED ADULT NURSE NOTE - OBJECTIVE STATEMENT
Patient presents due to nausea and hx of gerd. Pt states she has zofran at home but has not been taking it because it constipates her.

## 2021-12-15 NOTE — ED PROVIDER NOTE - PATIENT PORTAL LINK FT
You can access the FollowMyHealth Patient Portal offered by [facility] by registering at the following website: [email or web address]. By joining [facility]’s FollowMyHealth portal, you will also be able to view your health information using other applications (apps) compatible with our system.

## 2021-12-15 NOTE — ED ADULT TRIAGE NOTE - CHIEF COMPLAINT QUOTE
Patient presents in ED with abdominal pain. Patient has a history of GERD but states " the medicine was not helping". Patient is also 6 weeks pregnant.

## 2021-12-15 NOTE — ED PROVIDER NOTE - OBJECTIVE STATEMENT
pt presents with 2 days intermittent worse when flat epighastric burning pain no prior treatment has had nml u/s on this pregnancy no vaginal bleeding no chest pain or sob. no prior treatment

## 2021-12-15 NOTE — ED PROVIDER NOTE - NSICDXPASTMEDICALHX_GEN_ALL_CORE_FT
PAST MEDICAL HISTORY:  Calculus of gallbladder with other cholecystitis without obstruction     Ectopic pregnancy, unspecified S/P Methotrexate Oct 2015    No pertinent past medical history

## 2021-12-18 ENCOUNTER — EMERGENCY (EMERGENCY)
Facility: HOSPITAL | Age: 31
LOS: 1 days | End: 2021-12-18
Attending: EMERGENCY MEDICINE | Admitting: EMERGENCY MEDICINE
Payer: MEDICAID

## 2021-12-18 VITALS
HEART RATE: 79 BPM | SYSTOLIC BLOOD PRESSURE: 105 MMHG | RESPIRATION RATE: 18 BRPM | HEIGHT: 62 IN | DIASTOLIC BLOOD PRESSURE: 84 MMHG | OXYGEN SATURATION: 98 % | TEMPERATURE: 99 F | WEIGHT: 141.54 LBS

## 2021-12-18 VITALS
RESPIRATION RATE: 14 BRPM | HEART RATE: 83 BPM | SYSTOLIC BLOOD PRESSURE: 99 MMHG | OXYGEN SATURATION: 100 % | TEMPERATURE: 98 F | DIASTOLIC BLOOD PRESSURE: 65 MMHG

## 2021-12-18 DIAGNOSIS — Z90.49 ACQUIRED ABSENCE OF OTHER SPECIFIED PARTS OF DIGESTIVE TRACT: Chronic | ICD-10-CM

## 2021-12-18 LAB
ALBUMIN SERPL ELPH-MCNC: 3.4 G/DL — SIGNIFICANT CHANGE UP (ref 3.3–5)
ALP SERPL-CCNC: 56 U/L — SIGNIFICANT CHANGE UP (ref 30–120)
ALT FLD-CCNC: 16 U/L DA — SIGNIFICANT CHANGE UP (ref 10–60)
ANION GAP SERPL CALC-SCNC: 8 MMOL/L — SIGNIFICANT CHANGE UP (ref 5–17)
APPEARANCE UR: CLEAR — SIGNIFICANT CHANGE UP
AST SERPL-CCNC: 10 U/L — SIGNIFICANT CHANGE UP (ref 10–40)
BASOPHILS # BLD AUTO: 0.03 K/UL — SIGNIFICANT CHANGE UP (ref 0–0.2)
BASOPHILS NFR BLD AUTO: 0.3 % — SIGNIFICANT CHANGE UP (ref 0–2)
BILIRUB SERPL-MCNC: 0.9 MG/DL — SIGNIFICANT CHANGE UP (ref 0.2–1.2)
BILIRUB UR-MCNC: NEGATIVE — SIGNIFICANT CHANGE UP
BUN SERPL-MCNC: 5 MG/DL — LOW (ref 7–23)
CALCIUM SERPL-MCNC: 8.5 MG/DL — SIGNIFICANT CHANGE UP (ref 8.4–10.5)
CHLORIDE SERPL-SCNC: 103 MMOL/L — SIGNIFICANT CHANGE UP (ref 96–108)
CO2 SERPL-SCNC: 27 MMOL/L — SIGNIFICANT CHANGE UP (ref 22–31)
COLOR SPEC: YELLOW — SIGNIFICANT CHANGE UP
CREAT SERPL-MCNC: 0.66 MG/DL — SIGNIFICANT CHANGE UP (ref 0.5–1.3)
DIFF PNL FLD: NEGATIVE — SIGNIFICANT CHANGE UP
EOSINOPHIL # BLD AUTO: 0.01 K/UL — SIGNIFICANT CHANGE UP (ref 0–0.5)
EOSINOPHIL NFR BLD AUTO: 0.1 % — SIGNIFICANT CHANGE UP (ref 0–6)
GLUCOSE SERPL-MCNC: 93 MG/DL — SIGNIFICANT CHANGE UP (ref 70–99)
GLUCOSE UR QL: NEGATIVE MG/DL — SIGNIFICANT CHANGE UP
HCG SERPL-ACNC: HIGH MIU/ML
HCG UR QL: POSITIVE
HCT VFR BLD CALC: 32 % — LOW (ref 34.5–45)
HGB BLD-MCNC: 10.6 G/DL — LOW (ref 11.5–15.5)
IMM GRANULOCYTES NFR BLD AUTO: 0.3 % — SIGNIFICANT CHANGE UP (ref 0–1.5)
KETONES UR-MCNC: NEGATIVE — SIGNIFICANT CHANGE UP
LEUKOCYTE ESTERASE UR-ACNC: NEGATIVE — SIGNIFICANT CHANGE UP
LIDOCAIN IGE QN: 139 U/L — SIGNIFICANT CHANGE UP (ref 73–393)
LYMPHOCYTES # BLD AUTO: 2.19 K/UL — SIGNIFICANT CHANGE UP (ref 1–3.3)
LYMPHOCYTES # BLD AUTO: 20.8 % — SIGNIFICANT CHANGE UP (ref 13–44)
MCHC RBC-ENTMCNC: 30 PG — SIGNIFICANT CHANGE UP (ref 27–34)
MCHC RBC-ENTMCNC: 33.1 GM/DL — SIGNIFICANT CHANGE UP (ref 32–36)
MCV RBC AUTO: 90.7 FL — SIGNIFICANT CHANGE UP (ref 80–100)
MONOCYTES # BLD AUTO: 0.72 K/UL — SIGNIFICANT CHANGE UP (ref 0–0.9)
MONOCYTES NFR BLD AUTO: 6.8 % — SIGNIFICANT CHANGE UP (ref 2–14)
NEUTROPHILS # BLD AUTO: 7.56 K/UL — HIGH (ref 1.8–7.4)
NEUTROPHILS NFR BLD AUTO: 71.7 % — SIGNIFICANT CHANGE UP (ref 43–77)
NITRITE UR-MCNC: NEGATIVE — SIGNIFICANT CHANGE UP
NRBC # BLD: 0 /100 WBCS — SIGNIFICANT CHANGE UP (ref 0–0)
PH UR: 8 — SIGNIFICANT CHANGE UP (ref 5–8)
PLATELET # BLD AUTO: 199 K/UL — SIGNIFICANT CHANGE UP (ref 150–400)
POTASSIUM SERPL-MCNC: 3.5 MMOL/L — SIGNIFICANT CHANGE UP (ref 3.5–5.3)
POTASSIUM SERPL-SCNC: 3.5 MMOL/L — SIGNIFICANT CHANGE UP (ref 3.5–5.3)
PROT SERPL-MCNC: 6.9 G/DL — SIGNIFICANT CHANGE UP (ref 6–8.3)
PROT UR-MCNC: NEGATIVE MG/DL — SIGNIFICANT CHANGE UP
RAPID RVP RESULT: SIGNIFICANT CHANGE UP
RBC # BLD: 3.53 M/UL — LOW (ref 3.8–5.2)
RBC # FLD: 13.2 % — SIGNIFICANT CHANGE UP (ref 10.3–14.5)
SARS-COV-2 RNA SPEC QL NAA+PROBE: SIGNIFICANT CHANGE UP
SODIUM SERPL-SCNC: 138 MMOL/L — SIGNIFICANT CHANGE UP (ref 135–145)
SP GR SPEC: 1.01 — SIGNIFICANT CHANGE UP (ref 1.01–1.02)
UROBILINOGEN FLD QL: NEGATIVE MG/DL — SIGNIFICANT CHANGE UP
WBC # BLD: 10.54 K/UL — HIGH (ref 3.8–10.5)
WBC # FLD AUTO: 10.54 K/UL — HIGH (ref 3.8–10.5)

## 2021-12-18 PROCEDURE — 76815 OB US LIMITED FETUS(S): CPT

## 2021-12-18 PROCEDURE — 80053 COMPREHEN METABOLIC PANEL: CPT

## 2021-12-18 PROCEDURE — 76802 OB US < 14 WKS ADDL FETUS: CPT

## 2021-12-18 PROCEDURE — 0225U NFCT DS DNA&RNA 21 SARSCOV2: CPT

## 2021-12-18 PROCEDURE — 84702 CHORIONIC GONADOTROPIN TEST: CPT

## 2021-12-18 PROCEDURE — 85025 COMPLETE CBC W/AUTO DIFF WBC: CPT

## 2021-12-18 PROCEDURE — 76815 OB US LIMITED FETUS(S): CPT | Mod: 26

## 2021-12-18 PROCEDURE — 99284 EMERGENCY DEPT VISIT MOD MDM: CPT

## 2021-12-18 PROCEDURE — 36415 COLL VENOUS BLD VENIPUNCTURE: CPT

## 2021-12-18 PROCEDURE — 81025 URINE PREGNANCY TEST: CPT

## 2021-12-18 PROCEDURE — 99284 EMERGENCY DEPT VISIT MOD MDM: CPT | Mod: 25

## 2021-12-18 PROCEDURE — 81003 URINALYSIS AUTO W/O SCOPE: CPT

## 2021-12-18 PROCEDURE — 83690 ASSAY OF LIPASE: CPT

## 2021-12-18 RX ORDER — FAMOTIDINE 10 MG/ML
20 INJECTION INTRAVENOUS ONCE
Refills: 0 | Status: COMPLETED | OUTPATIENT
Start: 2021-12-18 | End: 2021-12-18

## 2021-12-18 RX ORDER — ONDANSETRON 8 MG/1
4 TABLET, FILM COATED ORAL ONCE
Refills: 0 | Status: COMPLETED | OUTPATIENT
Start: 2021-12-18 | End: 2021-12-18

## 2021-12-18 RX ORDER — SODIUM CHLORIDE 9 MG/ML
1000 INJECTION INTRAMUSCULAR; INTRAVENOUS; SUBCUTANEOUS ONCE
Refills: 0 | Status: COMPLETED | OUTPATIENT
Start: 2021-12-18 | End: 2021-12-18

## 2021-12-18 RX ADMIN — SODIUM CHLORIDE 1000 MILLILITER(S): 9 INJECTION INTRAMUSCULAR; INTRAVENOUS; SUBCUTANEOUS at 18:56

## 2021-12-18 RX ADMIN — Medication 30 MILLILITER(S): at 18:55

## 2021-12-18 RX ADMIN — FAMOTIDINE 20 MILLIGRAM(S): 10 INJECTION INTRAVENOUS at 18:55

## 2021-12-18 RX ADMIN — ONDANSETRON 4 MILLIGRAM(S): 8 TABLET, FILM COATED ORAL at 18:55

## 2021-12-18 NOTE — ED PROVIDER NOTE - NSFOLLOWUPINSTRUCTIONS_ED_ALL_ED_FT
Follow up with your OB/GYN on Monday, 12/20 for re-evaluation, ongoing care and treatment. Rest, drink plenty of fluids, continue taking zofran and pepcid as prescribed earlier. If having worsening of symptoms or other related symptoms, RETURN TO THE ER IMMEDIATELY.     Hyperemesis Gravidarum      Hyperemesis gravidarum is a severe form of nausea and vomiting that happens during pregnancy. Hyperemesis is worse than morning sickness. It may cause you to have nausea or vomiting all day for many days. It may keep you from eating and drinking enough food and liquids, which can lead to dehydration, malnutrition, and weight loss. Hyperemesis usually occurs during the first half (the first 20 weeks) of pregnancy. It often goes away once a woman is in her second half of pregnancy. However, sometimes hyperemesis continues through an entire pregnancy.      What are the causes?  The cause of this condition is not known. It may be associated with:  •Changes in hormones in the body during pregnancy.      •Changes in the gastrointestinal system.      •Genetic or inherited conditions.        What are the signs or symptoms?  Symptoms of this condition include:  •Severe nausea and vomiting that does not go away.      •Problems keeping food down.      •Weight loss.      •Loss of body fluid (dehydration).      •Loss of appetite. You may have no desire to eat or you may not like the food you have previously enjoyed.        How is this diagnosed?    This condition may be diagnosed based on your medical history, your symptoms, and a physical exam.  You may also have other tests, including:  •Blood tests.      •Urine tests.      •Blood pressure tests.      •Ultrasound to look for problems with the placenta or to check if you are pregnant with more than one baby.        How is this treated?  This condition is managed by controlling symptoms. This may include:  •Following an eating plan. This can help to lessen nausea and vomiting.      •Treatments that do not use medicine. These include acupressure bracelets, hypnosis, and eating or drinking foods or fluids that contain ginger, ginger ale, or ginger tea.      •Taking prescription medicine or over-the-counter medicine as told by your health care provider.      •Continuing to take prenatal vitamins. You may need to change what kind you take and when you take them. Follow your health care provider's instructions about prenatal vitamins.      An eating plan and medicines are often used together to help control symptoms. If medicines do not help relieve nausea and vomiting, you may need to receive fluids through an IV at the hospital.      Follow these instructions at home:    To help relieve your symptoms, listen to your body. Everyone is different and has different preferences. Find what works best for you. Here are some things you can try to help relieve your symptoms:      Meals and snacks      •Eat 5–6 small meals daily instead of 3 large meals. Eating small meals and snacks can help you avoid an empty stomach.      •Before getting out of bed, eat a couple of crackers to avoid moving around on an empty stomach.      •Eat a protein-rich snack before bed. Examples include cheese and crackers, or a peanut butter sandwich made with 1 slice of whole-wheat bread and 1 tsp (5 g) of peanut butter.      •Eat and drink slowly.      •Try eating starchy foods as these are usually tolerated well. Examples include cereal, toast, bread, potatoes, pasta, rice, and pretzels.       •Eat at least one serving of protein with your meals and snacks. Protein options include lean meats, poultry, seafood, beans, nuts, nut butters, eggs, cheese, and yogurt.      •Eat or suck on things that have ginger in them. It may help to relieve nausea. Add ¼ tsp (0.44 g) ground ginger to hot tea, or choose ginger tea.      Fluids   It is important to stay hydrated. Try to:  •Drink small amounts of fluids often.      •Drink fluids 30 minutes before or after a meal to help lessen the feeling of a full stomach.      •Drink 100% fruit juice or an electrolyte drink. An electrolyte drink contains sodium, potassium, and chloride.      •Drink fluids that are cold, clear, and carbonated or sour. These include lemonade, ginger ale, lemon–lime soda, ice water, and sparkling water.      Things to avoid  Avoid the following:  •Eating foods that trigger your symptoms. These may include spicy foods, coffee, high-fat foods, very sweet foods, and acidic foods.      •Drinking more than 1 cup of fluid at a time.       •Skipping meals. Nausea can be more intense on an empty stomach. If you cannot tolerate food, do not force it. Try sucking on ice chips or other frozen items and make up for missed calories later.      •Lying down within 2 hours after eating.       •Being exposed to environmental triggers. These may include food smells, smoky rooms, closed spaces, rooms with strong smells, warm or humid places, overly loud and noisy rooms, and rooms with motion or flickering lights. Try eating meals in a well-ventilated area that is free of strong smells.       •Making quick and sudden changes in your movement.      •Taking iron pills and multivitamins that contain iron. If you take prescription iron pills, do not stop taking them unless your health care provider approves.       •Preparing food. The smell of food can spoil your appetite or trigger nausea.      General instructions    •Brush your teeth or use a mouth rinse after meals.      •Take over-the-counter and prescription medicines only as told by your health care provider.      •Follow instructions from your health care provider about eating or drinking restrictions.      •Talk with your health care provider about starting a supplement of vitamin B6.      •Continue to take your prenatal vitamins as told by your health care provider. If you are having trouble taking your prenatal vitamins, talk with your health care provider about other options.      •Keep all follow-up visits. This is important. Follow-up visits include prenatal visits.        Contact a health care provider if:    •You have pain in your abdomen.      •You have a severe headache.      •You have vision problems.      •You are losing weight.      •You feel weak or dizzy.      •You cannot eat or drink without vomiting, especially if this goes on for a full day.         Get help right away if:    •You cannot drink fluids without vomiting.      •You vomit blood.      •You have constant nausea and vomiting.      •You are very weak.      •You faint.      •You have a fever and your symptoms suddenly get worse.        Summary    •Hyperemesis gravidarum is a severe form of nausea and vomiting that happens during pregnancy.      •Making some changes to your eating habits may help relieve nausea and vomiting.      •This condition may be managed with lifestyle changes and medicines as prescribed by your health care provider.      •If medicines do not help relieve nausea and vomiting, you may need to receive fluids through an IV at the hospital.      This information is not intended to replace advice given to you by your health care provider. Make sure you discuss any questions you have with your health care provider.

## 2021-12-18 NOTE — ED PROVIDER NOTE - PATIENT PORTAL LINK FT
You can access the FollowMyHealth Patient Portal offered by [facility] by registering at the following website: [email or web address]. By joining [employer]’s FollowMyHealth portal, you will also be able to view your health information using other applications (apps) compatible with our system.

## 2021-12-18 NOTE — ED ADULT NURSE REASSESSMENT NOTE - NS ED NURSE REASSESS COMMENT FT1
MD and PA aware of all results. pt verbalized understanding of d/c instructions. left unit in NAD. ambulated unassisted
received report and assumed care of pt at change of shift. pt returned from ultrasound. awaits results and disposition at this time. urines to lab. will continue to monitor

## 2021-12-18 NOTE — ED PROVIDER NOTE - OBJECTIVE STATEMENT
30 y/o pregnant F  LMP: 10/27 with hx of ectopic pregnancy, calculus of gallbladder, s/p cholecystectomy presents with c/o n/v and abdominal pain x 2 weeks. States that she has been on zofran without much relief. States that she has had GERD and was told to take pepcid and omeprazole by her OB. States that she is also taking stool softener. Pt was seen in [place] ED few days ago for epigastric pain and given antacids and discharged. Denies fever, vaginal bleeding, CP, SOB, dysuria, diarrhea or other symptoms.

## 2021-12-18 NOTE — ED ADULT NURSE NOTE - OBJECTIVE STATEMENT
patient is aaox3, c/o left abdominal pain and 7 weeks pregnant, denies bleeding, c/o weak and chills, will continue to monitor.

## 2021-12-18 NOTE — ED ADULT NURSE NOTE - BOWEL SOUNDS RLQ
Detail Level: Simple
Additional Notes: Patient consent was obtained to proceed with the visit and recommended plan of care after discussion of all risks and benefits, including the risks of COVID-19 exposure.
present

## 2021-12-18 NOTE — ED PROVIDER NOTE - NS_ ATTENDINGSCRIBEDETAILS _ED_A_ED_FT
Ousmane Aj MD - The scribe's documentation has been prepared under my direction and personally reviewed by me in its entirety. I confirm that the note above accurately reflects all work, treatment, procedures, and medical decision making performed by me.

## 2021-12-18 NOTE — ED PROVIDER NOTE - PROGRESS NOTE DETAILS
Kayden IRVIN for ED attending, Dr. Aj: 32 y/o F w/ PMHx of ectopic pregnancy, calculus of gallbladder, s/p cholecystectomy presents to ED c/o and pain w/ n/v over the last 2 weeks. Pt reports she cannot keep down PO and water today. Pt reports this is her 3rd pregnancy, previous 2 she carried to full term w/ no complication. On exam: NAD, abd soft nontender no guarding no rebound no distention. Will get labs fluid and medication. Reevaluated patient at bedside.  Patient feeling much improved.  Discussed the results of all diagnostic testing in ED and copies of all reports given.   An opportunity to ask questions was given.  Discussed the importance of prompt, close medical follow-up.  Patient will return with any changes, concerns or persistent / worsening symptoms.  Understanding of all instructions verbalized. Reevaluated patient at bedside.  Patient feeling much improved.  Discussed the results of all diagnostic testing in ED and copies of all reports given. Advised to f/u with OB and GI, continue meds and hydrate. An opportunity to ask questions was given.  Discussed the importance of prompt, close medical follow-up.  Patient will return with any changes, concerns or persistent / worsening symptoms.  Understanding of all instructions verbalized.

## 2021-12-18 NOTE — ED PROVIDER NOTE - CLINICAL SUMMARY MEDICAL DECISION MAKING FREE TEXT BOX
32 y/o pregnant F  LMP: 10/27 with hx of ectopic pregnancy, calculus of gallbladder, s/p cholecystectomy presents with c/o n/v and abdominal pain x 2 weeks. States that she has been on zofran without much relief. States that she has had GERD and was told to take pepcid and omeprazole by her OB. States that she is also taking stool softener. Pt was seen in [place] ED few days ago for epigastric pain and given antacids and discharged. Denies fever, vaginal bleeding, CP, SOB, dysuria, diarrhea or other symptoms. PE: as above A/P; will get labs, UA, pelvic US, IVF, pepcid, maalox, reassess

## 2022-01-02 NOTE — PATIENT PROFILE OB - TOBACCO USE
PAST MEDICAL HISTORY:  Back pain Secondary to Hypertrophy of Breast    Bipolar depression     Hypertrophy of breast     Neck and shoulder pain Secondary to Hypertrophy of Breast    
Never smoker

## 2022-01-11 RX ORDER — FAMOTIDINE 20 MG/1
20 TABLET, FILM COATED ORAL DAILY
Qty: 30 | Refills: 2 | Status: DISCONTINUED | COMMUNITY
Start: 2021-05-27 | End: 2022-01-11

## 2022-01-20 ENCOUNTER — APPOINTMENT (OUTPATIENT)
Dept: NEUROLOGY | Facility: CLINIC | Age: 32
End: 2022-01-20

## 2022-01-26 ENCOUNTER — ASOB RESULT (OUTPATIENT)
Age: 32
End: 2022-01-26

## 2022-01-26 ENCOUNTER — APPOINTMENT (OUTPATIENT)
Dept: ANTEPARTUM | Facility: CLINIC | Age: 32
End: 2022-01-26
Payer: MEDICAID

## 2022-01-26 PROCEDURE — 76801 OB US < 14 WKS SINGLE FETUS: CPT

## 2022-01-26 PROCEDURE — 76813 OB US NUCHAL MEAS 1 GEST: CPT | Mod: 59

## 2022-02-09 RX ORDER — FAMOTIDINE 10 MG/1
10 TABLET ORAL DAILY
Qty: 30 | Refills: 0 | Status: DISCONTINUED | COMMUNITY
Start: 2021-05-12 | End: 2022-02-09

## 2022-02-10 ENCOUNTER — APPOINTMENT (OUTPATIENT)
Dept: FAMILY MEDICINE | Facility: CLINIC | Age: 32
End: 2022-02-10
Payer: MEDICAID

## 2022-02-10 VITALS
BODY MASS INDEX: 25.52 KG/M2 | SYSTOLIC BLOOD PRESSURE: 103 MMHG | HEART RATE: 82 BPM | OXYGEN SATURATION: 100 % | DIASTOLIC BLOOD PRESSURE: 67 MMHG | WEIGHT: 144 LBS | HEIGHT: 63 IN | TEMPERATURE: 98 F

## 2022-02-10 PROCEDURE — 99213 OFFICE O/P EST LOW 20 MIN: CPT

## 2022-02-10 NOTE — PLAN
[FreeTextEntry1] : Advised re GERD precautions\par Advised re pregnanacy nause protections\par Continue present meds\par could try tums or [place]\par Suggested saltines and liquid.

## 2022-02-10 NOTE — HISTORY OF PRESENT ILLNESS
[FreeTextEntry8] : CONSULT - c/o chronic nause. No vomiting.\par Taking omeprazole and famotidine. Now is 14 weeks pregnant

## 2022-03-16 ENCOUNTER — APPOINTMENT (OUTPATIENT)
Dept: ANTEPARTUM | Facility: CLINIC | Age: 32
End: 2022-03-16

## 2022-03-16 NOTE — ED ADULT NURSE NOTE - NSSUHOSCREENINGYN_ED_ALL_ED
Implemented All Universal Safety Interventions:  Tea to call system. Call bell, personal items and telephone within reach. Instruct patient to call for assistance. Room bathroom lighting operational. Non-slip footwear when patient is off stretcher. Physically safe environment: no spills, clutter or unnecessary equipment. Stretcher in lowest position, wheels locked, appropriate side rails in place. Yes - the patient is able to be screened

## 2022-03-21 ENCOUNTER — ASOB RESULT (OUTPATIENT)
Age: 32
End: 2022-03-21

## 2022-03-21 ENCOUNTER — APPOINTMENT (OUTPATIENT)
Dept: ANTEPARTUM | Facility: CLINIC | Age: 32
End: 2022-03-21

## 2022-03-21 ENCOUNTER — APPOINTMENT (OUTPATIENT)
Dept: ANTEPARTUM | Facility: CLINIC | Age: 32
End: 2022-03-21
Payer: MEDICAID

## 2022-03-21 PROCEDURE — 76805 OB US >/= 14 WKS SNGL FETUS: CPT

## 2022-03-23 ENCOUNTER — APPOINTMENT (OUTPATIENT)
Dept: ANTEPARTUM | Facility: CLINIC | Age: 32
End: 2022-03-23

## 2022-04-05 ENCOUNTER — ASOB RESULT (OUTPATIENT)
Age: 32
End: 2022-04-05

## 2022-04-05 ENCOUNTER — APPOINTMENT (OUTPATIENT)
Dept: ANTEPARTUM | Facility: CLINIC | Age: 32
End: 2022-04-05
Payer: MEDICAID

## 2022-04-05 PROCEDURE — 76820 UMBILICAL ARTERY ECHO: CPT

## 2022-04-05 PROCEDURE — 76816 OB US FOLLOW-UP PER FETUS: CPT

## 2022-04-08 ENCOUNTER — ASOB RESULT (OUTPATIENT)
Age: 32
End: 2022-04-08

## 2022-04-08 ENCOUNTER — APPOINTMENT (OUTPATIENT)
Dept: ANTEPARTUM | Facility: CLINIC | Age: 32
End: 2022-04-08
Payer: MEDICAID

## 2022-04-08 PROCEDURE — 76815 OB US LIMITED FETUS(S): CPT

## 2022-04-08 PROCEDURE — 76820 UMBILICAL ARTERY ECHO: CPT

## 2022-04-12 ENCOUNTER — APPOINTMENT (OUTPATIENT)
Dept: ANTEPARTUM | Facility: CLINIC | Age: 32
End: 2022-04-12

## 2022-04-13 ENCOUNTER — APPOINTMENT (OUTPATIENT)
Dept: ANTEPARTUM | Facility: CLINIC | Age: 32
End: 2022-04-13

## 2022-04-15 ENCOUNTER — NON-APPOINTMENT (OUTPATIENT)
Age: 32
End: 2022-04-15

## 2022-04-16 ENCOUNTER — OUTPATIENT (OUTPATIENT)
Dept: INPATIENT UNIT | Facility: HOSPITAL | Age: 32
LOS: 1 days | Discharge: HOME | End: 2022-04-16
Payer: MEDICAID

## 2022-04-16 DIAGNOSIS — O26.899 OTHER SPECIFIED PREGNANCY RELATED CONDITIONS, UNSPECIFIED TRIMESTER: ICD-10-CM

## 2022-04-16 DIAGNOSIS — Z90.49 ACQUIRED ABSENCE OF OTHER SPECIFIED PARTS OF DIGESTIVE TRACT: Chronic | ICD-10-CM

## 2022-04-16 PROCEDURE — 76815 OB US LIMITED FETUS(S): CPT

## 2022-04-16 PROCEDURE — 76815 OB US LIMITED FETUS(S): CPT | Mod: 26

## 2022-04-16 PROCEDURE — 76819 FETAL BIOPHYS PROFIL W/O NST: CPT | Mod: 26

## 2022-04-16 PROCEDURE — G0463: CPT

## 2022-04-16 PROCEDURE — 99214 OFFICE O/P EST MOD 30 MIN: CPT

## 2022-04-16 PROCEDURE — 76819 FETAL BIOPHYS PROFIL W/O NST: CPT

## 2022-04-18 ENCOUNTER — ASOB RESULT (OUTPATIENT)
Age: 32
End: 2022-04-18

## 2022-04-18 ENCOUNTER — NON-APPOINTMENT (OUTPATIENT)
Age: 32
End: 2022-04-18

## 2022-04-18 ENCOUNTER — APPOINTMENT (OUTPATIENT)
Dept: ANTEPARTUM | Facility: CLINIC | Age: 32
End: 2022-04-18
Payer: MEDICAID

## 2022-04-18 ENCOUNTER — APPOINTMENT (OUTPATIENT)
Dept: ANTEPARTUM | Facility: CLINIC | Age: 32
End: 2022-04-18

## 2022-04-18 ENCOUNTER — APPOINTMENT (OUTPATIENT)
Dept: MATERNAL FETAL MEDICINE | Facility: CLINIC | Age: 32
End: 2022-04-18
Payer: MEDICAID

## 2022-04-18 VITALS
RESPIRATION RATE: 17 BRPM | OXYGEN SATURATION: 99 % | WEIGHT: 154 LBS | HEIGHT: 63 IN | HEART RATE: 82 BPM | BODY MASS INDEX: 27.29 KG/M2 | SYSTOLIC BLOOD PRESSURE: 112 MMHG | DIASTOLIC BLOOD PRESSURE: 74 MMHG

## 2022-04-18 DIAGNOSIS — R11.0 OTHER SPECIFIED PREGNANCY RELATED CONDITIONS, UNSPECIFIED TRIMESTER: ICD-10-CM

## 2022-04-18 DIAGNOSIS — R76.8 OTHER SPECIFIED ABNORMAL IMMUNOLOGICAL FINDINGS IN SERUM: ICD-10-CM

## 2022-04-18 DIAGNOSIS — Z87.39 PERSONAL HISTORY OF OTHER DISEASES OF THE MUSCULOSKELETAL SYSTEM AND CONNECTIVE TISSUE: ICD-10-CM

## 2022-04-18 DIAGNOSIS — O26.899 OTHER SPECIFIED PREGNANCY RELATED CONDITIONS, UNSPECIFIED TRIMESTER: ICD-10-CM

## 2022-04-18 DIAGNOSIS — R51.9 HEADACHE, UNSPECIFIED: ICD-10-CM

## 2022-04-18 DIAGNOSIS — M47.812 SPONDYLOSIS W/OUT MYELOPATHY OR RADICULOPATHY, CERVICAL REGION: ICD-10-CM

## 2022-04-18 DIAGNOSIS — G93.5 COMPRESSION OF BRAIN: ICD-10-CM

## 2022-04-18 DIAGNOSIS — H04.123 DRY EYE SYNDROME OF BILATERAL LACRIMAL GLANDS: ICD-10-CM

## 2022-04-18 DIAGNOSIS — Z87.898 PERSONAL HISTORY OF OTHER SPECIFIED CONDITIONS: ICD-10-CM

## 2022-04-18 DIAGNOSIS — S86.819A STRAIN OF OTHER MUSCLE(S) AND TENDON(S) AT LOWER LEG LEVEL, UNSPECIFIED LEG, INITIAL ENCOUNTER: ICD-10-CM

## 2022-04-18 DIAGNOSIS — I83.813 VARICOSE VEINS OF BILATERAL LOWER EXTREMITIES WITH PAIN: ICD-10-CM

## 2022-04-18 DIAGNOSIS — Z78.9 OTHER SPECIFIED HEALTH STATUS: ICD-10-CM

## 2022-04-18 DIAGNOSIS — I83.93 ASYMPTOMATIC VARICOSE VEINS OF BILATERAL LOWER EXTREMITIES: ICD-10-CM

## 2022-04-18 DIAGNOSIS — I87.2 VENOUS INSUFFICIENCY (CHRONIC) (PERIPHERAL): ICD-10-CM

## 2022-04-18 DIAGNOSIS — G43.009 MIGRAINE W/OUT AURA, NOT INTRACTABLE, W/OUT STATUS MIGRAINOSUS: ICD-10-CM

## 2022-04-18 PROCEDURE — 76821 MIDDLE CEREBRAL ARTERY ECHO: CPT

## 2022-04-18 PROCEDURE — 76820 UMBILICAL ARTERY ECHO: CPT

## 2022-04-18 PROCEDURE — 93976 VASCULAR STUDY: CPT

## 2022-04-18 PROCEDURE — 99205 OFFICE O/P NEW HI 60 MIN: CPT

## 2022-04-18 RX ORDER — ERGOCALCIFEROL 1.25 MG/1
1.25 MG CAPSULE, LIQUID FILLED ORAL
Qty: 4 | Refills: 0 | Status: DISCONTINUED | COMMUNITY
Start: 2021-11-22 | End: 2022-04-18

## 2022-04-18 RX ORDER — CYCLOBENZAPRINE HYDROCHLORIDE 10 MG/1
10 TABLET, FILM COATED ORAL
Qty: 10 | Refills: 0 | Status: DISCONTINUED | COMMUNITY
Start: 2019-08-16 | End: 2022-04-18

## 2022-04-18 RX ORDER — CHOLECALCIFEROL (VITAMIN D3) 1250 MCG
1.25 MG CAPSULE ORAL
Qty: 4 | Refills: 0 | Status: DISCONTINUED | COMMUNITY
Start: 2018-03-20 | End: 2022-04-18

## 2022-04-18 NOTE — DISCUSSION/SUMMARY
[FreeTextEntry1] : She is 24 weeks and 5 days gestation by her last menstrual period dates.\par \par An ultrasound examination done on 2022 reported the estimated fetal weight to be 13 ounces or 355 g which was below the 10th percentile for the gestational age. The umbilical artery S/D Doppler studies were reported to be elevated at the 95th percentile for the gestational age. The amniotic fluid volume was subjectively decreased with a maximum vertical pocket of 2.9 cm.  A follow-up ultrasound on 2022 reported the amniotic fluid to be subjectively decreased with a maximum vertical pocket of 2.2 cm.  Echogenic fetal bowel was also reported.  The umbilical artery SD ratio was within normal limits with episodes of intermittent absent end-diastolic velocity.  An ultrasound done today reported the amniotic fluid volume to be in the low normal range with a maximum vertical pocket of 2.5 cm.  Doppler studies of the umbilical artery, middle cerebral artery, and uterine arteries were done due to the reported fetal growth restriction.  All Doppler studies were noted to be within normal limits.  The fetal bowel was noted to have an echogenic appearance.\par \par I told her that isolated fetal growth restriction during early second trimester has been reported to occur in less than 3 % in one cohort study (Obstet Gynecol 2017; 130: 865-9). It was associated with an increased risk for an SGA infant, stillbirth,  death, and indicated  delivery.  I told her that fetal infections with rubella and cytomegalovirus have been associated with symmetrical growth restriction.  Fetuses with congenital abnormalities such as cardiac malformations are also known to have symmetrical growth restriction.  Fetuses with chromosomal disorders such as trisomy 13, 18, and 21 are also known to manifest early onset symmetrical growth restriction.  The antiphospholipid syndrome caused by anticardiolipin, and Lupus anticoagulant antibodies also presents with early onset symmetrical fetal growth restriction. Inherited thrombophilias can also present with fetal growth restriction. Fetuses can also be growth restricted in the presence of maternal hypertensive disease and impaired uterine blood flow. The probability of a good pregnancy outcome is low if the cause of the fetal symmetrical restriction is a fetal chromosomal disorder, major cardiac malformation, viral infection, antiphospholipid syndrome or thrombophilia. There is also the possibility that the fetus could be constitutionally small.  She does not have chronic hypertension or an impaired uterine arteries circulation.  My review of her prenatal record revealed that the parvovirus and toxoplasma serology were reported to be within normal limits.  She is rubella immune.  I told her that there were no major fetal cardiac malformations reported on the fetal anatomy ultrasound examination. I ordered anticardiolipin antibodies, lupus anticoagulant testing, and beta-2 glycoprotein 1 antibodies to exclude antiphospholipid syndrome as the reason for the small size fetus.\par \par Regarding the fetal echogenic bowel, I discussed the possible causes of echogenic bowel such as fetal aneuploidy, cytomegalovirus infection, cystic fibrosis, and prenatal vaginal bleeding.  She was also made aware that on occasion high frequency transducers accentuate the echogenicity of fetal small bowel submucosa in fetuses that have normal pregnancy outcomes.  I told her that it can be a transient idiopathic finding in approximately 0.5% of fetuses. I also told her that fetal gastrointestinal pathology such as bowel obstruction, atresia, and perforation can also produce an echogenic appearance of the fetal bowel. I told her that fetuses with Hirschsprung disease can also have echogenic bowel. Approximately 3.3% to 16% of fetuses with echogenic bowel have chromosomal abnormalities. I told her that congenital infection has been associated with fetal echogenic bowel in approximately 3% to 4% of the cases. In the [place] cytomegalovirus is the most common infection associated with echogenic bowel. Toxoplasmosis, rubella, varicella, herpes, and parvovirus infections have also been known to be associated with echogenic bowel. I told her that without a history of exposure or other clinical risk factors testing for the other congenital infections is not recommended due to an expected low yield of positive results. She is rubella immune. She denies having herpes, living with cats, and eating raw foods. She denies having been exposed to individuals with Fifth’s disease and varicella.  She is varicella immune. I told her that echogenic bowel has also been associated with intrauterine growth restriction and intrauterine fetal demise. Therefore, serial fetal growth ultrasound examinations and  testing is suggested.  Her cystic fibrosis carrier status is negative. I ordered cytomegalovirus serology testing since I did not find a cytomegalovirus serology test in her prenatal records. \par \par She told me that she received the COVID-19 vaccines during January and 2021. She also received the COVID-19 vaccine booster during 2022.\par \par She was scheduled to have a follow-up [facility] visit in 2 weeks. \par \par

## 2022-04-18 NOTE — OB HISTORY
[Pregnancy History] : patient received anesthesia [LMP: ___] : LMP: [unfilled] [JOE: ___] : JOE: [unfilled] [EGA: ___ wks] : EGA: [unfilled] wks [Spontaneous] : Spontaneous conception [Sonogram] : sonogram [at ___ wks] : at [unfilled] weeks [Definite:  ___ (Date)] : the last menstrual period was [unfilled] [Normal Amount/Duration] : was of a normal amount and duration [Regular Cycle Intervals] : periods have been regular [___] : no pregnancy complications reported [FreeTextEntry1] : First prenatal visit was 12/20/2021.\par \par First trimester screen test done on January 26, 2022 reported a low risk for Down syndrome and trisomy 18/13.  A sequential screen test done on 2/16/2022 reported a low risk for Down syndrome and trisomy 18.  The maternal serum alpha-fetoprotein was borderline elevated at 2.13 multiples of the median.\par \par A maternal serum quadruple screen test done on 2/28/2022 reported a low risk for Down syndrome, trisomy 18, and open neural tube defects.  The maternal serum alpha-fetoprotein was reported to be within normal limits.\par \par She told me that she went to a hospital on 4/14/2022 because of decreased fetal movements.  She was given steroids to promote fetal lung maturity [Spotting Between  Menses] : no spotting between menses

## 2022-04-18 NOTE — VITALS
[LMP (date): ___] : LMP was on [unfilled] [GA =___ Weeks] : which calculates to a GA of [unfilled] weeks [GA= ___ Days] : and [unfilled] day(s) [JOE by LMP (date): ___] : The calculated JOE by LMP is [unfilled] [By LMP] : this is the final JOE

## 2022-04-18 NOTE — CHIEF COMPLAINT
[G ___] : G [unfilled] [P ___] : P [unfilled] [de-identified] : early onset fetal growth restriction and fetal echogenic bowel

## 2022-04-18 NOTE — FAMILY HISTORY
[Age 35+ During Pregnancy] : not 35 or over during pregnancy [Reported Family History Of Birth Defects] : no congenital heart defects [Tutu-Sachs Carrier] : no Tutu-Sachs [Family History] : no mental retardation/autism [Reported Family History Of Genetic Disease] : no history of child defect in child of baby father

## 2022-04-20 ENCOUNTER — LABORATORY RESULT (OUTPATIENT)
Age: 32
End: 2022-04-20

## 2022-04-20 ENCOUNTER — APPOINTMENT (OUTPATIENT)
Dept: ANTEPARTUM | Facility: CLINIC | Age: 32
End: 2022-04-20

## 2022-04-20 ENCOUNTER — APPOINTMENT (OUTPATIENT)
Dept: MATERNAL FETAL MEDICINE | Facility: CLINIC | Age: 32
End: 2022-04-20

## 2022-04-21 DIAGNOSIS — Z3A.00 WEEKS OF GESTATION OF PREGNANCY NOT SPECIFIED: ICD-10-CM

## 2022-04-21 DIAGNOSIS — O26.899 OTHER SPECIFIED PREGNANCY RELATED CONDITIONS, UNSPECIFIED TRIMESTER: ICD-10-CM

## 2022-04-21 LAB
B2 GLYCOPROT1 AB SER QL: NEGATIVE
CMV IGG SERPL QL: 3 U/ML
CMV IGG SERPL-IMP: POSITIVE
CMV IGM SERPL QL: <8 AU/ML
CMV IGM SERPL QL: NEGATIVE

## 2022-04-22 ENCOUNTER — APPOINTMENT (OUTPATIENT)
Dept: ANTEPARTUM | Facility: CLINIC | Age: 32
End: 2022-04-22
Payer: MEDICAID

## 2022-04-22 ENCOUNTER — ASOB RESULT (OUTPATIENT)
Age: 32
End: 2022-04-22

## 2022-04-22 LAB
CARDIOLIPIN IGM SER-MCNC: 7.2 MPL
CARDIOLIPIN IGM SER-MCNC: <5 GPL

## 2022-04-22 PROCEDURE — 93976 VASCULAR STUDY: CPT

## 2022-04-22 PROCEDURE — 76820 UMBILICAL ARTERY ECHO: CPT

## 2022-04-26 ENCOUNTER — ASOB RESULT (OUTPATIENT)
Age: 32
End: 2022-04-26

## 2022-04-26 ENCOUNTER — APPOINTMENT (OUTPATIENT)
Dept: ANTEPARTUM | Facility: CLINIC | Age: 32
End: 2022-04-26

## 2022-04-26 ENCOUNTER — APPOINTMENT (OUTPATIENT)
Dept: ANTEPARTUM | Facility: CLINIC | Age: 32
End: 2022-04-26
Payer: MEDICAID

## 2022-04-26 PROCEDURE — 76816 OB US FOLLOW-UP PER FETUS: CPT

## 2022-04-29 ENCOUNTER — ASOB RESULT (OUTPATIENT)
Age: 32
End: 2022-04-29

## 2022-04-29 ENCOUNTER — APPOINTMENT (OUTPATIENT)
Dept: ANTEPARTUM | Facility: CLINIC | Age: 32
End: 2022-04-29
Payer: MEDICAID

## 2022-04-29 PROCEDURE — 76820 UMBILICAL ARTERY ECHO: CPT

## 2022-04-29 PROCEDURE — 93976 VASCULAR STUDY: CPT

## 2022-05-02 ENCOUNTER — APPOINTMENT (OUTPATIENT)
Dept: ANTEPARTUM | Facility: CLINIC | Age: 32
End: 2022-05-02
Payer: MEDICAID

## 2022-05-02 ENCOUNTER — ASOB RESULT (OUTPATIENT)
Age: 32
End: 2022-05-02

## 2022-05-02 ENCOUNTER — APPOINTMENT (OUTPATIENT)
Dept: MATERNAL FETAL MEDICINE | Facility: CLINIC | Age: 32
End: 2022-05-02
Payer: MEDICAID

## 2022-05-02 VITALS
RESPIRATION RATE: 18 BRPM | HEART RATE: 78 BPM | DIASTOLIC BLOOD PRESSURE: 78 MMHG | BODY MASS INDEX: 29.41 KG/M2 | OXYGEN SATURATION: 99 % | WEIGHT: 166 LBS | SYSTOLIC BLOOD PRESSURE: 118 MMHG | HEIGHT: 63 IN

## 2022-05-02 VITALS
SYSTOLIC BLOOD PRESSURE: 118 MMHG | HEART RATE: 78 BPM | WEIGHT: 166 LBS | HEIGHT: 63 IN | RESPIRATION RATE: 18 BRPM | BODY MASS INDEX: 29.41 KG/M2 | DIASTOLIC BLOOD PRESSURE: 78 MMHG | OXYGEN SATURATION: 99 %

## 2022-05-02 PROCEDURE — 99215 OFFICE O/P EST HI 40 MIN: CPT

## 2022-05-02 PROCEDURE — 76820 UMBILICAL ARTERY ECHO: CPT

## 2022-05-02 NOTE — DISCUSSION/SUMMARY
[FreeTextEntry1] : She is 26 weeks and 5 days gestation by her last menstrual period dates.\par \par An ultrasound examination done on 4/28/2022 reported the estimated fetal weight to be 1 pound 1 ounce 485 g which was less than the 1st percentile for the gestational age.  Today (5/2/2022) she had umbilical artery Doppler studies which were reported to be within normal limits with the umbilical artery SD ratio = 2.4.  The amniotic fluid volume was reported to be within normal limits.  She was scheduled to have a follow-up ultrasound examination in 1 week to evaluate the fetal growth, the umbilical artery Doppler SD ratio, and the fetal biophysical profile.\par \par I have reviewed the laboratory test results for acquired thrombophilia.  All test results were reported to be within normal limits.  I also review cytomegalovirus serology test results which were within normal limits for pregnancy.  I told her that based on the test results and my review of her medical records there is good possibility that the fetus could be a normal constitutionally small fetus. She was advised to have a follow-up MFM visit in 4 weeks.\par \par Regarding the fetal echogenic bowel, the cytomegalovirus serology testing was within normal limits for pregnancy.  I again discussed the possible causes of echogenic bowel.  I again told her that it could be a transient idiopathic finding or an artifact from high frequency ultrasound transducers which accentuate the echogenicity of fetal small bowel submucosa in fetuses that have normal pregnancy outcomes.  I also told her that fetal gastrointestinal pathology such as bowel obstruction, atresia, and perforation can also produce an echogenic appearance of the fetal bowel.  The ultrasound imaging up-to-date has not been suggestive of GI abnormalities.  The fetal bowel imaging during today's ultrasound examination was limited. Fetuses with Hirschsprung disease can also have echogenic bowel.  I also reminded her that fetal echogenic bowel has also been associated with fetal intrauterine growth restriction and her fetus has fetal growth restriction.  She was scheduled to have a follow-up MFM visit in 4 weeks. \par \par

## 2022-05-02 NOTE — VITALS
[LMP (date): ___] : LMP was on [unfilled] [GA= ___ Days] : and [unfilled] day(s) [JOE by LMP (date): ___] : The calculated JOE by LMP is [unfilled] [By LMP] : this is the final JOE [GA =___ Weeks] : which calculates to a GA of [unfilled] weeks

## 2022-05-02 NOTE — CHIEF COMPLAINT
[G ___] : G [unfilled] [P ___] : P [unfilled] [de-identified] : early onset fetal growth restriction and fetal echogenic bowel

## 2022-05-02 NOTE — OB HISTORY
[Pregnancy History] : patient received anesthesia [LMP: ___] : LMP: [unfilled] [JOE: ___] : JOE: [unfilled] [Spontaneous] : Spontaneous conception [Sonogram] : sonogram [at ___ wks] : at [unfilled] weeks [Definite:  ___ (Date)] : the last menstrual period was [unfilled] [Normal Amount/Duration] : was of a normal amount and duration [Regular Cycle Intervals] : periods have been regular [EGA: ___ wks] : EGA: [unfilled] wks [___] : no pregnancy complications reported [FreeTextEntry1] : First prenatal visit was 12/20/2021.\par \par First trimester screen test done on January 26, 2022 reported a low risk for Down syndrome and trisomy 18/13.  A sequential screen test done on 2/16/2022 reported a low risk for Down syndrome and trisomy 18.  The maternal serum alpha-fetoprotein was borderline elevated at 2.13 multiples of the median.\par \par A maternal serum quadruple screen test done on 2/28/2022 reported a low risk for Down syndrome, trisomy 18, and open neural tube defects.  The maternal serum alpha-fetoprotein was reported to be within normal limits.\par \par She told me that she went to a hospital on 4/14/2022 because of decreased fetal movements.  She was given steroids to promote fetal lung maturity [Spotting Between  Menses] : no spotting between menses

## 2022-05-06 ENCOUNTER — APPOINTMENT (OUTPATIENT)
Dept: ANTEPARTUM | Facility: CLINIC | Age: 32
End: 2022-05-06

## 2022-05-10 ENCOUNTER — ASOB RESULT (OUTPATIENT)
Age: 32
End: 2022-05-10

## 2022-05-10 ENCOUNTER — APPOINTMENT (OUTPATIENT)
Dept: ANTEPARTUM | Facility: CLINIC | Age: 32
End: 2022-05-10
Payer: MEDICAID

## 2022-05-10 PROCEDURE — 76819 FETAL BIOPHYS PROFIL W/O NST: CPT

## 2022-05-10 PROCEDURE — 76816 OB US FOLLOW-UP PER FETUS: CPT

## 2022-05-10 PROCEDURE — 76820 UMBILICAL ARTERY ECHO: CPT

## 2022-05-17 ENCOUNTER — ASOB RESULT (OUTPATIENT)
Age: 32
End: 2022-05-17

## 2022-05-17 ENCOUNTER — APPOINTMENT (OUTPATIENT)
Dept: ANTEPARTUM | Facility: CLINIC | Age: 32
End: 2022-05-17

## 2022-05-17 ENCOUNTER — APPOINTMENT (OUTPATIENT)
Dept: ANTEPARTUM | Facility: CLINIC | Age: 32
End: 2022-05-17
Payer: MEDICAID

## 2022-05-17 PROCEDURE — 76819 FETAL BIOPHYS PROFIL W/O NST: CPT

## 2022-05-17 PROCEDURE — 76820 UMBILICAL ARTERY ECHO: CPT

## 2022-05-24 ENCOUNTER — APPOINTMENT (OUTPATIENT)
Dept: ANTEPARTUM | Facility: CLINIC | Age: 32
End: 2022-05-24

## 2022-05-25 ENCOUNTER — APPOINTMENT (OUTPATIENT)
Dept: ANTEPARTUM | Facility: CLINIC | Age: 32
End: 2022-05-25
Payer: MEDICAID

## 2022-05-25 ENCOUNTER — ASOB RESULT (OUTPATIENT)
Age: 32
End: 2022-05-25

## 2022-05-25 PROCEDURE — 76816 OB US FOLLOW-UP PER FETUS: CPT

## 2022-05-25 PROCEDURE — 76821 MIDDLE CEREBRAL ARTERY ECHO: CPT

## 2022-05-25 PROCEDURE — 93976 VASCULAR STUDY: CPT

## 2022-05-25 PROCEDURE — 76820 UMBILICAL ARTERY ECHO: CPT

## 2022-05-26 ENCOUNTER — APPOINTMENT (OUTPATIENT)
Dept: FAMILY MEDICINE | Facility: CLINIC | Age: 32
End: 2022-05-26
Payer: MEDICAID

## 2022-05-26 VITALS
HEIGHT: 63 IN | SYSTOLIC BLOOD PRESSURE: 126 MMHG | DIASTOLIC BLOOD PRESSURE: 82 MMHG | OXYGEN SATURATION: 100 % | WEIGHT: 168 LBS | BODY MASS INDEX: 29.77 KG/M2 | HEART RATE: 100 BPM | TEMPERATURE: 98.7 F

## 2022-05-26 DIAGNOSIS — J02.9 ACUTE PHARYNGITIS, UNSPECIFIED: ICD-10-CM

## 2022-05-26 DIAGNOSIS — Z3A.26 26 WEEKS GESTATION OF PREGNANCY: ICD-10-CM

## 2022-05-26 PROCEDURE — 99213 OFFICE O/P EST LOW 20 MIN: CPT

## 2022-05-26 NOTE — PLAN
[FreeTextEntry1] : Gargle with warm salt water\par Chloraseptic spray as needed for sore throat\par Drink warm liquids\par Get plenty of rest\par Drink plenty of fluids\par Tylenol l as needed for pain or fever\par F/u if not improving or if symptoms worsen\par

## 2022-05-26 NOTE — HISTORY OF PRESENT ILLNESS
[FreeTextEntry8] : pt is here for possible strep\par Sore throat started last night.  diagnosed with strep throat 2 weeks ago

## 2022-05-27 NOTE — PATIENT PROFILE OB - WEIGHT PRESENT IN KG
72
Quality 130: Documentation Of Current Medications In The Medical Record: Current Medications Documented
Detail Level: Detailed

## 2022-05-31 ENCOUNTER — APPOINTMENT (OUTPATIENT)
Dept: ANTEPARTUM | Facility: CLINIC | Age: 32
End: 2022-05-31
Payer: MEDICAID

## 2022-05-31 ENCOUNTER — ASOB RESULT (OUTPATIENT)
Age: 32
End: 2022-05-31

## 2022-05-31 PROCEDURE — 76820 UMBILICAL ARTERY ECHO: CPT

## 2022-05-31 PROCEDURE — 76819 FETAL BIOPHYS PROFIL W/O NST: CPT

## 2022-05-31 PROCEDURE — 76821 MIDDLE CEREBRAL ARTERY ECHO: CPT

## 2022-06-01 ENCOUNTER — APPOINTMENT (OUTPATIENT)
Dept: ANTEPARTUM | Facility: CLINIC | Age: 32
End: 2022-06-01

## 2022-06-01 ENCOUNTER — APPOINTMENT (OUTPATIENT)
Dept: MATERNAL FETAL MEDICINE | Facility: CLINIC | Age: 32
End: 2022-06-01

## 2022-06-08 ENCOUNTER — ASOB RESULT (OUTPATIENT)
Age: 32
End: 2022-06-08

## 2022-06-08 ENCOUNTER — APPOINTMENT (OUTPATIENT)
Dept: ANTEPARTUM | Facility: CLINIC | Age: 32
End: 2022-06-08
Payer: MEDICAID

## 2022-06-08 PROCEDURE — 76820 UMBILICAL ARTERY ECHO: CPT

## 2022-06-08 PROCEDURE — 76819 FETAL BIOPHYS PROFIL W/O NST: CPT

## 2022-06-14 ENCOUNTER — ASOB RESULT (OUTPATIENT)
Age: 32
End: 2022-06-14

## 2022-06-14 ENCOUNTER — APPOINTMENT (OUTPATIENT)
Dept: ANTEPARTUM | Facility: CLINIC | Age: 32
End: 2022-06-14
Payer: MEDICAID

## 2022-06-14 PROCEDURE — 76816 OB US FOLLOW-UP PER FETUS: CPT

## 2022-06-14 PROCEDURE — 76820 UMBILICAL ARTERY ECHO: CPT

## 2022-06-14 PROCEDURE — 76819 FETAL BIOPHYS PROFIL W/O NST: CPT

## 2022-06-14 PROCEDURE — 93976 VASCULAR STUDY: CPT

## 2022-06-22 ENCOUNTER — APPOINTMENT (OUTPATIENT)
Dept: ANTEPARTUM | Facility: CLINIC | Age: 32
End: 2022-06-22
Payer: MEDICAID

## 2022-06-22 ENCOUNTER — ASOB RESULT (OUTPATIENT)
Age: 32
End: 2022-06-22

## 2022-06-22 PROCEDURE — 76820 UMBILICAL ARTERY ECHO: CPT

## 2022-06-22 PROCEDURE — 93976 VASCULAR STUDY: CPT

## 2022-06-22 PROCEDURE — 76819 FETAL BIOPHYS PROFIL W/O NST: CPT

## 2022-06-29 ENCOUNTER — ASOB RESULT (OUTPATIENT)
Age: 32
End: 2022-06-29

## 2022-06-29 ENCOUNTER — APPOINTMENT (OUTPATIENT)
Dept: ANTEPARTUM | Facility: CLINIC | Age: 32
End: 2022-06-29

## 2022-06-29 PROCEDURE — 76821 MIDDLE CEREBRAL ARTERY ECHO: CPT

## 2022-06-29 PROCEDURE — 76820 UMBILICAL ARTERY ECHO: CPT

## 2022-06-29 PROCEDURE — 76816 OB US FOLLOW-UP PER FETUS: CPT

## 2022-06-29 PROCEDURE — 93976 VASCULAR STUDY: CPT

## 2022-07-06 ENCOUNTER — ASOB RESULT (OUTPATIENT)
Age: 32
End: 2022-07-06

## 2022-07-06 ENCOUNTER — APPOINTMENT (OUTPATIENT)
Dept: ANTEPARTUM | Facility: CLINIC | Age: 32
End: 2022-07-06

## 2022-07-06 PROCEDURE — 76819 FETAL BIOPHYS PROFIL W/O NST: CPT

## 2022-07-06 PROCEDURE — 76820 UMBILICAL ARTERY ECHO: CPT

## 2022-07-12 ENCOUNTER — INPATIENT (INPATIENT)
Facility: HOSPITAL | Age: 32
LOS: 2 days | Discharge: HOME | DRG: 560 | End: 2022-07-15
Attending: OBSTETRICS & GYNECOLOGY | Admitting: OBSTETRICS & GYNECOLOGY
Payer: MEDICAID

## 2022-07-12 VITALS — HEIGHT: 63 IN | WEIGHT: 173.94 LBS

## 2022-07-12 DIAGNOSIS — O36.5930 MATERNAL CARE FOR OTHER KNOWN OR SUSPECTED POOR FETAL GROWTH, THIRD TRIMESTER, NOT APPLICABLE OR UNSPECIFIED: ICD-10-CM

## 2022-07-12 DIAGNOSIS — Z3A.00 WEEKS OF GESTATION OF PREGNANCY NOT SPECIFIED: ICD-10-CM

## 2022-07-12 DIAGNOSIS — Z90.49 ACQUIRED ABSENCE OF OTHER SPECIFIED PARTS OF DIGESTIVE TRACT: Chronic | ICD-10-CM

## 2022-07-12 DIAGNOSIS — O26.899 OTHER SPECIFIED PREGNANCY RELATED CONDITIONS, UNSPECIFIED TRIMESTER: ICD-10-CM

## 2022-07-12 DIAGNOSIS — Z3A.37 37 WEEKS GESTATION OF PREGNANCY: ICD-10-CM

## 2022-07-12 LAB
BASOPHILS # BLD AUTO: 0.02 K/UL — SIGNIFICANT CHANGE UP (ref 0–0.2)
BASOPHILS NFR BLD AUTO: 0.3 % — SIGNIFICANT CHANGE UP (ref 0–2)
EOSINOPHIL # BLD AUTO: 0.06 K/UL — SIGNIFICANT CHANGE UP (ref 0–0.5)
EOSINOPHIL NFR BLD AUTO: 0.8 % — SIGNIFICANT CHANGE UP (ref 0–6)
HCT VFR BLD CALC: 32 % — LOW (ref 34.5–45)
HGB BLD-MCNC: 10.8 G/DL — LOW (ref 11.5–15.5)
IMM GRANULOCYTES NFR BLD AUTO: 0.5 % — SIGNIFICANT CHANGE UP (ref 0–1.5)
LYMPHOCYTES # BLD AUTO: 2.42 K/UL — SIGNIFICANT CHANGE UP (ref 1–3.3)
LYMPHOCYTES # BLD AUTO: 30.4 % — SIGNIFICANT CHANGE UP (ref 13–44)
MCHC RBC-ENTMCNC: 30.9 PG — SIGNIFICANT CHANGE UP (ref 27–34)
MCHC RBC-ENTMCNC: 33.8 GM/DL — SIGNIFICANT CHANGE UP (ref 32–36)
MCV RBC AUTO: 91.4 FL — SIGNIFICANT CHANGE UP (ref 80–100)
MONOCYTES # BLD AUTO: 0.57 K/UL — SIGNIFICANT CHANGE UP (ref 0–0.9)
MONOCYTES NFR BLD AUTO: 7.2 % — SIGNIFICANT CHANGE UP (ref 2–14)
NEUTROPHILS # BLD AUTO: 4.85 K/UL — SIGNIFICANT CHANGE UP (ref 1.8–7.4)
NEUTROPHILS NFR BLD AUTO: 60.8 % — SIGNIFICANT CHANGE UP (ref 43–77)
PLATELET # BLD AUTO: 190 K/UL — SIGNIFICANT CHANGE UP (ref 150–400)
RBC # BLD: 3.5 M/UL — LOW (ref 3.8–5.2)
RBC # FLD: 13.2 % — SIGNIFICANT CHANGE UP (ref 10.3–14.5)
WBC # BLD: 7.96 K/UL — SIGNIFICANT CHANGE UP (ref 3.8–10.5)
WBC # FLD AUTO: 7.96 K/UL — SIGNIFICANT CHANGE UP (ref 3.8–10.5)

## 2022-07-12 PROCEDURE — 59200 INSERT CERVICAL DILATOR: CPT

## 2022-07-12 PROCEDURE — 85018 HEMOGLOBIN: CPT

## 2022-07-12 PROCEDURE — 86901 BLOOD TYPING SEROLOGIC RH(D): CPT

## 2022-07-12 PROCEDURE — 59050 FETAL MONITOR W/REPORT: CPT

## 2022-07-12 PROCEDURE — 86850 RBC ANTIBODY SCREEN: CPT

## 2022-07-12 PROCEDURE — 36415 COLL VENOUS BLD VENIPUNCTURE: CPT

## 2022-07-12 PROCEDURE — U0005: CPT

## 2022-07-12 PROCEDURE — C1889: CPT

## 2022-07-12 PROCEDURE — 86900 BLOOD TYPING SEROLOGIC ABO: CPT

## 2022-07-12 PROCEDURE — 85014 HEMATOCRIT: CPT

## 2022-07-12 PROCEDURE — 86769 SARS-COV-2 COVID-19 ANTIBODY: CPT

## 2022-07-12 PROCEDURE — 85025 COMPLETE CBC W/AUTO DIFF WBC: CPT

## 2022-07-12 PROCEDURE — 86780 TREPONEMA PALLIDUM: CPT

## 2022-07-12 PROCEDURE — 94760 N-INVAS EAR/PLS OXIMETRY 1: CPT

## 2022-07-12 PROCEDURE — U0003: CPT

## 2022-07-12 RX ORDER — OXYTOCIN 10 UNIT/ML
333.33 VIAL (ML) INJECTION
Qty: 20 | Refills: 0 | Status: COMPLETED | OUTPATIENT
Start: 2022-07-12 | End: 2022-07-13

## 2022-07-12 RX ORDER — SODIUM CHLORIDE 9 MG/ML
1000 INJECTION, SOLUTION INTRAVENOUS
Refills: 0 | Status: DISCONTINUED | OUTPATIENT
Start: 2022-07-12 | End: 2022-07-13

## 2022-07-12 RX ORDER — CHLORHEXIDINE GLUCONATE 213 G/1000ML
1 SOLUTION TOPICAL ONCE
Refills: 0 | Status: DISCONTINUED | OUTPATIENT
Start: 2022-07-12 | End: 2022-07-13

## 2022-07-12 RX ORDER — AMPICILLIN TRIHYDRATE 250 MG
2 CAPSULE ORAL ONCE
Refills: 0 | Status: COMPLETED | OUTPATIENT
Start: 2022-07-12 | End: 2022-07-13

## 2022-07-12 RX ORDER — DINOPROSTONE 10 MG/241MG
10 INSERT VAGINAL ONCE
Refills: 0 | Status: COMPLETED | OUTPATIENT
Start: 2022-07-12 | End: 2022-07-13

## 2022-07-12 RX ORDER — CITRIC ACID/SODIUM CITRATE 300-500 MG
30 SOLUTION, ORAL ORAL ONCE
Refills: 0 | Status: DISCONTINUED | OUTPATIENT
Start: 2022-07-12 | End: 2022-07-13

## 2022-07-12 RX ORDER — AMPICILLIN TRIHYDRATE 250 MG
1 CAPSULE ORAL EVERY 4 HOURS
Refills: 0 | Status: DISCONTINUED | OUTPATIENT
Start: 2022-07-12 | End: 2022-07-13

## 2022-07-12 NOTE — PATIENT PROFILE OB - FUNCTIONAL ASSESSMENT - BASIC MOBILITY 6.
4-calculated by average/Not able to assess (calculate score using [facility] averaging method)

## 2022-07-12 NOTE — PATIENT PROFILE OB - FUNCTIONAL ASSESSMENT - DAILY ACTIVITY 5.
Patient removed from bipap, tolerated well all night long. Patient is on n/c 3 liters. 4 = No assist / stand by assistance

## 2022-07-13 ENCOUNTER — TRANSCRIPTION ENCOUNTER (OUTPATIENT)
Age: 32
End: 2022-07-13

## 2022-07-13 LAB
COVID-19 SPIKE DOMAIN AB INTERP: POSITIVE
COVID-19 SPIKE DOMAIN ANTIBODY RESULT: >250 U/ML — HIGH
SARS-COV-2 IGG+IGM SERPL QL IA: >250 U/ML — HIGH
SARS-COV-2 IGG+IGM SERPL QL IA: POSITIVE
SARS-COV-2 RNA SPEC QL NAA+PROBE: SIGNIFICANT CHANGE UP
T PALLIDUM AB TITR SER: NEGATIVE — SIGNIFICANT CHANGE UP

## 2022-07-13 RX ORDER — BENZOCAINE 10 %
1 GEL (GRAM) MUCOUS MEMBRANE EVERY 6 HOURS
Refills: 0 | Status: DISCONTINUED | OUTPATIENT
Start: 2022-07-13 | End: 2022-07-15

## 2022-07-13 RX ORDER — IBUPROFEN 200 MG
600 TABLET ORAL EVERY 6 HOURS
Refills: 0 | Status: COMPLETED | OUTPATIENT
Start: 2022-07-13 | End: 2023-06-11

## 2022-07-13 RX ORDER — DIBUCAINE 1 %
1 OINTMENT (GRAM) RECTAL EVERY 6 HOURS
Refills: 0 | Status: DISCONTINUED | OUTPATIENT
Start: 2022-07-13 | End: 2022-07-15

## 2022-07-13 RX ORDER — DIPHENHYDRAMINE HCL 50 MG
25 CAPSULE ORAL EVERY 6 HOURS
Refills: 0 | Status: DISCONTINUED | OUTPATIENT
Start: 2022-07-13 | End: 2022-07-15

## 2022-07-13 RX ORDER — SIMETHICONE 80 MG/1
80 TABLET, CHEWABLE ORAL EVERY 4 HOURS
Refills: 0 | Status: DISCONTINUED | OUTPATIENT
Start: 2022-07-13 | End: 2022-07-15

## 2022-07-13 RX ORDER — SODIUM CHLORIDE 9 MG/ML
1000 INJECTION, SOLUTION INTRAVENOUS
Refills: 0 | Status: DISCONTINUED | OUTPATIENT
Start: 2022-07-13 | End: 2022-07-15

## 2022-07-13 RX ORDER — ACETAMINOPHEN 500 MG
975 TABLET ORAL
Refills: 0 | Status: DISCONTINUED | OUTPATIENT
Start: 2022-07-13 | End: 2022-07-15

## 2022-07-13 RX ORDER — KETOROLAC TROMETHAMINE 30 MG/ML
30 SYRINGE (ML) INJECTION ONCE
Refills: 0 | Status: DISCONTINUED | OUTPATIENT
Start: 2022-07-13 | End: 2022-07-13

## 2022-07-13 RX ORDER — HYDROCORTISONE 1 %
1 OINTMENT (GRAM) TOPICAL EVERY 6 HOURS
Refills: 0 | Status: DISCONTINUED | OUTPATIENT
Start: 2022-07-13 | End: 2022-07-15

## 2022-07-13 RX ORDER — PRAMOXINE HYDROCHLORIDE 150 MG/15G
1 AEROSOL, FOAM RECTAL EVERY 4 HOURS
Refills: 0 | Status: DISCONTINUED | OUTPATIENT
Start: 2022-07-13 | End: 2022-07-15

## 2022-07-13 RX ORDER — OXYCODONE HYDROCHLORIDE 5 MG/1
5 TABLET ORAL
Refills: 0 | Status: DISCONTINUED | OUTPATIENT
Start: 2022-07-13 | End: 2022-07-15

## 2022-07-13 RX ORDER — MAGNESIUM HYDROXIDE 400 MG/1
30 TABLET, CHEWABLE ORAL
Refills: 0 | Status: DISCONTINUED | OUTPATIENT
Start: 2022-07-13 | End: 2022-07-15

## 2022-07-13 RX ORDER — OXYTOCIN 10 UNIT/ML
2 VIAL (ML) INJECTION
Qty: 30 | Refills: 0 | Status: DISCONTINUED | OUTPATIENT
Start: 2022-07-13 | End: 2022-07-15

## 2022-07-13 RX ORDER — AER TRAVELER 0.5 G/1
1 SOLUTION RECTAL; TOPICAL EVERY 4 HOURS
Refills: 0 | Status: DISCONTINUED | OUTPATIENT
Start: 2022-07-13 | End: 2022-07-15

## 2022-07-13 RX ORDER — LANOLIN
1 OINTMENT (GRAM) TOPICAL EVERY 6 HOURS
Refills: 0 | Status: DISCONTINUED | OUTPATIENT
Start: 2022-07-13 | End: 2022-07-15

## 2022-07-13 RX ORDER — SODIUM CHLORIDE 9 MG/ML
3 INJECTION INTRAMUSCULAR; INTRAVENOUS; SUBCUTANEOUS EVERY 8 HOURS
Refills: 0 | Status: DISCONTINUED | OUTPATIENT
Start: 2022-07-13 | End: 2022-07-14

## 2022-07-13 RX ORDER — OXYCODONE HYDROCHLORIDE 5 MG/1
5 TABLET ORAL ONCE
Refills: 0 | Status: DISCONTINUED | OUTPATIENT
Start: 2022-07-13 | End: 2022-07-15

## 2022-07-13 RX ORDER — TETANUS TOXOID, REDUCED DIPHTHERIA TOXOID AND ACELLULAR PERTUSSIS VACCINE, ADSORBED 5; 2.5; 8; 8; 2.5 [IU]/.5ML; [IU]/.5ML; UG/.5ML; UG/.5ML; UG/.5ML
0.5 SUSPENSION INTRAMUSCULAR ONCE
Refills: 0 | Status: DISCONTINUED | OUTPATIENT
Start: 2022-07-13 | End: 2022-07-15

## 2022-07-13 RX ORDER — OXYTOCIN 10 UNIT/ML
333.33 VIAL (ML) INJECTION
Qty: 20 | Refills: 0 | Status: DISCONTINUED | OUTPATIENT
Start: 2022-07-13 | End: 2022-07-15

## 2022-07-13 RX ADMIN — SODIUM CHLORIDE 125 MILLILITER(S): 9 INJECTION, SOLUTION INTRAVENOUS at 16:30

## 2022-07-13 RX ADMIN — Medication 1000 MILLIUNIT(S)/MIN: at 21:27

## 2022-07-13 RX ADMIN — DINOPROSTONE 10 MILLIGRAM(S): 10 INSERT VAGINAL at 00:17

## 2022-07-13 RX ADMIN — Medication 2 MILLIUNIT(S)/MIN: at 13:45

## 2022-07-13 RX ADMIN — Medication 30 MILLIGRAM(S): at 21:27

## 2022-07-13 RX ADMIN — Medication 216 GRAM(S): at 12:15

## 2022-07-13 RX ADMIN — Medication 108 GRAM(S): at 16:09

## 2022-07-13 RX ADMIN — SODIUM CHLORIDE 125 MILLILITER(S): 9 INJECTION, SOLUTION INTRAVENOUS at 18:01

## 2022-07-13 NOTE — DISCHARGE NOTE OB - NS MD DC FALL RISK RISK
For information on Fall & Injury Prevention, visit: [email or web address] OR  [email or web address] OR  https://www.cdc.gov/steadi/patient.html

## 2022-07-13 NOTE — DISCHARGE NOTE OB - CARE PROVIDER_API CALL
Roman Bansal  OBSTETRICS AND GYNECOLOGY  [address] [facility], [address] [place] [address]  Phone: [phone]  Fax: [phone]  Established Patient  Follow Up Time: 1 month

## 2022-07-13 NOTE — DISCHARGE NOTE OB - CARE PLAN
Principal Discharge DX:	Delivery normal  Assessment and plan of treatment:	Congratulations!  Please call the office with any questions or problems.  Place nothing into the vagina for six weeks ( no tampons, sex, or douching).  Call the office for a postpartum appointment to be scheduled six weeks after delivery.  Expect to feel hot flashes in the first two weeks after delivery, especially if your breasts are engorged.  Secondary Diagnosis:	Pregnancy affected by fetal growth restriction  Assessment and plan of treatment:	your  will be followed closely by the neonatologists until stable for discharge home   1

## 2022-07-13 NOTE — DISCHARGE NOTE OB - MEDICATION SUMMARY - MEDICATIONS TO TAKE
0800 Accucheck > 500, MD aware. 10 units aspart given per MD. Medication adjustments made, WCTM BG q4h.   I will START or STAY ON the medications listed below when I get home from the hospital:    ibuprofen 600 mg oral tablet  -- 1 tab(s) by mouth every 6 hours, As needed, Moderate Pain -for moderate pain MDD:4  -- Indication: For for moderate pain, postpartum    Prenatal Multivitamins with Folic Acid 1 mg oral tablet  -- 1 tab(s) by mouth once a day  -- Indication: For continue daily

## 2022-07-13 NOTE — DISCHARGE NOTE OB - MEDICATION SUMMARY - MEDICATIONS TO STOP TAKING
I will STOP taking the medications listed below when I get home from the hospital:    cyclobenzaprine 5 mg oral tablet  -- 1 tab(s) by mouth every 6 hours, As Needed muscle spasm, pain  -- Some non-prescription drugs may aggravate your condition.  Read all labels carefully.  If a warning appears, check with your doctor before taking.  This drug may impair the ability to drive or operate machinery.  Use care until you become familiar with its effects.

## 2022-07-13 NOTE — DISCHARGE NOTE OB - PLAN OF CARE
your  will be followed closely by the neonatologists until stable for discharge home Congratulations!  Please call the office with any questions or problems.  Place nothing into the vagina for six weeks ( no tampons, sex, or douching).  Call the office for a postpartum appointment to be scheduled six weeks after delivery.  Expect to feel hot flashes in the first two weeks after delivery, especially if your breasts are engorged.

## 2022-07-13 NOTE — DISCHARGE NOTE OB - PATIENT PORTAL LINK FT
You can access the FollowMyHealth Patient Portal offered by [facility] by registering at the following website: [email or web address]. By joining [employer]’s FollowMyHealth portal, you will also be able to view your health information using other applications (apps) compatible with our system.

## 2022-07-14 LAB
HCT VFR BLD CALC: 32.4 % — LOW (ref 34.5–45)
HGB BLD-MCNC: 10.6 G/DL — LOW (ref 11.5–15.5)

## 2022-07-14 RX ORDER — IBUPROFEN 200 MG
600 TABLET ORAL EVERY 6 HOURS
Refills: 0 | Status: DISCONTINUED | OUTPATIENT
Start: 2022-07-14 | End: 2022-07-15

## 2022-07-14 RX ADMIN — Medication 600 MILLIGRAM(S): at 18:04

## 2022-07-14 RX ADMIN — Medication 1 TABLET(S): at 15:51

## 2022-07-14 RX ADMIN — Medication 975 MILLIGRAM(S): at 04:00

## 2022-07-14 RX ADMIN — Medication 600 MILLIGRAM(S): at 13:06

## 2022-07-14 RX ADMIN — Medication 975 MILLIGRAM(S): at 22:47

## 2022-07-14 RX ADMIN — Medication 975 MILLIGRAM(S): at 16:07

## 2022-07-14 RX ADMIN — Medication 975 MILLIGRAM(S): at 21:50

## 2022-07-14 RX ADMIN — Medication 600 MILLIGRAM(S): at 07:00

## 2022-07-14 RX ADMIN — Medication 975 MILLIGRAM(S): at 09:16

## 2022-07-14 RX ADMIN — Medication 600 MILLIGRAM(S): at 19:00

## 2022-07-14 RX ADMIN — Medication 975 MILLIGRAM(S): at 15:51

## 2022-07-14 RX ADMIN — Medication 975 MILLIGRAM(S): at 03:34

## 2022-07-14 RX ADMIN — Medication 600 MILLIGRAM(S): at 06:27

## 2022-07-14 RX ADMIN — Medication 600 MILLIGRAM(S): at 12:00

## 2022-07-14 NOTE — PROGRESS NOTE ADULT - SUBJECTIVE AND OBJECTIVE BOX
PPD #1      No heavy vaginal bleeding.    T(C): 36.7 (22 @ 08:00), Max: 37.3 (22 @ 22:46)  HR: 63 (22 @ 08:00) (61 - 92)  BP: 113/68 (22 @ 08:00) (105/67 - 139/84)  RR: 18 (22 @ 08:00) (16 - 18)  SpO2: 98% (22 @ 08:00) (97% - 100%)  Wt(kg): --    Abd:  soft, NT  Fundus:  firm  - Lochia- Minimal  Extr:  no Victor Hugo's sign, +1 pedal edema                        10.6   x     )-----------( x        ( 2022 08:30 )             32.4     A/P:  PPD# 1      routine postpartum care and education  Breast feeding support and education.  anticipate discharge home tomorrow.

## 2022-07-15 ENCOUNTER — NON-APPOINTMENT (OUTPATIENT)
Age: 32
End: 2022-07-15

## 2022-07-15 VITALS
RESPIRATION RATE: 18 BRPM | HEART RATE: 67 BPM | OXYGEN SATURATION: 100 % | SYSTOLIC BLOOD PRESSURE: 112 MMHG | DIASTOLIC BLOOD PRESSURE: 78 MMHG | TEMPERATURE: 98 F

## 2022-07-15 RX ORDER — IBUPROFEN 200 MG
1 TABLET ORAL
Qty: 30 | Refills: 1
Start: 2022-07-15 | End: 2022-09-12

## 2022-07-15 RX ADMIN — Medication 600 MILLIGRAM(S): at 01:17

## 2022-07-15 RX ADMIN — Medication 600 MILLIGRAM(S): at 09:35

## 2022-07-15 RX ADMIN — Medication 600 MILLIGRAM(S): at 00:27

## 2022-07-15 NOTE — PROGRESS NOTE ADULT - SUBJECTIVE AND OBJECTIVE BOX
PPD #2      Tolerating uterine cramps.  Pumping for breast milk.    T(C): 36.9 (07-15-22 @ 08:13), Max: 36.9 (07-15-22 @ 00:05)  HR: 67 (07-15-22 @ 08:13) (67 - 67)  BP: 112/78 (07-15-22 @ 08:13) (104/61 - 112/78)  RR: 18 (07-15-22 @ 08:13) (18 - 18)  SpO2: 100% (07-15-22 @ 08:13) (100% - 100%)  Wt(kg): --    Abd:  soft, NT  Fundus:  firm  - Lochia- Minimal  Extr:  no Victor Hugo's sign, no pedal edema                        10.6   x     )-----------( x        ( 2022 08:30 )             32.4         A/P:  PPD#2       routine postpartum care and education  Breast feeding support and education.  Discharge home today.

## 2022-08-23 NOTE — ED PROVIDER NOTE - CARE PROVIDER_API CALL
Call for outreach/enrollment made.  Pt answered the phone, I introduced myself and asked if it was an okay time to talk about his health and how he's doing.  Pt requested a call back after 5pm tonight; however, I will not be available at that time so we agreed to talk tomorrow and I will call him in the afternoon.    
Encounter created for RN Care Management enrollment.  Initial outreach to be made within 5 business days.      
Situation: Patient contacted for CMRN program enrollment     Key Assessments:Patient agreeable to CMRN services.  Richard had back/spine surgery in July, recently dc'd from [facility] but states he is doing well.      Recent PHQ 2/9 Score    PHQ 2:  Date Adult PHQ 2 Score Adult PHQ 2 Interpretation   8/23/2022 0 No further screening needed       PHQ 9:  Date Adult PHQ 9 Score Adult PHQ 9 Interpretation   10/12/2020 13 Moderate Depression          Actions Taken: Introduced CMRN program, willing and acceptable to participate in program.  Reviewed program goals and provided patient with contact information.        Plan:  Go through med list, disease specific assessments, and establish a goal    
[facility]OUR OB/GYN,   Phone: (   )    -  Fax: (   )    -  Follow Up Time: 1-3 Days

## 2022-09-01 ENCOUNTER — APPOINTMENT (OUTPATIENT)
Dept: VASCULAR SURGERY | Facility: CLINIC | Age: 32
End: 2022-09-01

## 2022-10-03 ENCOUNTER — APPOINTMENT (OUTPATIENT)
Dept: VASCULAR SURGERY | Facility: CLINIC | Age: 32
End: 2022-10-03

## 2022-10-24 ENCOUNTER — APPOINTMENT (OUTPATIENT)
Dept: FAMILY MEDICINE | Facility: CLINIC | Age: 32
End: 2022-10-24

## 2022-10-24 VITALS
HEART RATE: 85 BPM | BODY MASS INDEX: 27.82 KG/M2 | SYSTOLIC BLOOD PRESSURE: 114 MMHG | DIASTOLIC BLOOD PRESSURE: 72 MMHG | WEIGHT: 157 LBS | TEMPERATURE: 97.2 F | OXYGEN SATURATION: 99 % | HEIGHT: 63 IN

## 2022-10-24 DIAGNOSIS — O36.5990 MATERNAL CARE FOR OTHER KNOWN OR SUSPECTED POOR FETAL GROWTH, UNSPECIFIED TRIMESTER, NOT APPLICABLE OR UNSPECIFIED: ICD-10-CM

## 2022-10-24 DIAGNOSIS — Z3A.30 30 WEEKS GESTATION OF PREGNANCY: ICD-10-CM

## 2022-10-24 LAB
BILIRUB UR QL STRIP: NORMAL
CLARITY UR: CLEAR
COLLECTION METHOD: NORMAL
GLUCOSE UR-MCNC: NORMAL
HCG UR QL: 0.2 EU/DL
HGB UR QL STRIP.AUTO: NORMAL
KETONES UR-MCNC: NORMAL
LEUKOCYTE ESTERASE UR QL STRIP: NORMAL
NITRITE UR QL STRIP: NORMAL
PH UR STRIP: 7
PROT UR STRIP-MCNC: NORMAL
SP GR UR STRIP: 1.01

## 2022-10-24 PROCEDURE — 81003 URINALYSIS AUTO W/O SCOPE: CPT | Mod: QW

## 2022-10-24 PROCEDURE — 99173 VISUAL ACUITY SCREEN: CPT

## 2022-10-24 PROCEDURE — 36415 COLL VENOUS BLD VENIPUNCTURE: CPT

## 2022-10-24 PROCEDURE — 99395 PREV VISIT EST AGE 18-39: CPT | Mod: 25

## 2022-10-24 PROCEDURE — 92551 PURE TONE HEARING TEST AIR: CPT

## 2022-10-24 RX ORDER — LEVONORGESTREL 19.5 MG/1
19.5 INTRAUTERINE DEVICE INTRAUTERINE
Qty: 1 | Refills: 0 | Status: DISCONTINUED | COMMUNITY
Start: 2022-09-02

## 2022-10-24 RX ORDER — LEVONORGESTREL 19.5 MG/1
19.5 INTRAUTERINE DEVICE INTRAUTERINE
Qty: 1 | Refills: 0 | Status: ACTIVE | COMMUNITY
Start: 2022-09-02

## 2022-10-24 RX ORDER — VITAMIN C, CALCIUM, IRON, VITAMIN D3, VITAMIN E, VITAMIN B1, VITAMIN B2, VITAMIN B3, VITAMIN B6, FOLIC ACID, IODINE, ZINC, COPPER, DOCUSATE SODIUM, DOCOSAHEXAENOIC ACID (DHA) 27-1-50 MG
KIT ORAL
Refills: 0 | Status: DISCONTINUED | COMMUNITY
End: 2022-10-24

## 2022-10-24 NOTE — HISTORY OF PRESENT ILLNESS
[FreeTextEntry1] : pt is here for cpe.\par Pt with history of low vitamin D. Took suppliments in the past Previously as low as 20\par Recently delivered baby. Doing well. No c/o\par Occ hair loss and ear itchy.

## 2022-10-24 NOTE — HEALTH RISK ASSESSMENT
[Never] : Never [0-4] : 0-4 [1 or 2 (0 pts)] : 1 or 2 (0 points) [Never (0 pts)] : Never (0 points) [No] : In the past 12 months have you used drugs other than those required for medical reasons? No [No falls in past year] : Patient reported no falls in the past year [0] : 2) Feeling down, depressed, or hopeless: Not at all (0) [HIV test declined] : HIV test declined [Hepatitis C test declined] : Hepatitis C test declined [With Family] : lives with family [# of Members in Household ___] :  household currently consist of [unfilled] member(s) [Employed] : employed [College] : College [] :  [# Of Children ___] : has [unfilled] children [Sexually Active] : sexually active [Feels Safe at Home] : Feels safe at home [Fully functional (bathing, dressing, toileting, transferring, walking, feeding)] : Fully functional (bathing, dressing, toileting, transferring, walking, feeding) [Smoke Detector] : smoke detector [Carbon Monoxide Detector] : carbon monoxide detector [Seat Belt] :  uses seat belt [Sunscreen] : uses sunscreen [Travel to Developing Areas] : travel to developing areas [Audit-CScore] : 0 [[id number]] : 0 [Change in mental status noted] : No change in mental status noted [Language] : denies difficulty with language [Behavior] : denies difficulty with behavior [Learning/Retaining New Information] : denies difficulty learning/retaining new information [Handling Complex Tasks] : denies difficulty handling complex tasks [Reasoning] : denies difficulty with reasoning [Spatial Ability and Orientation] : denies difficulty with spatial ability and orientation [Reports changes in hearing] : Reports no changes in hearing [Reports changes in vision] : Reports no changes in vision [Reports changes in dental health] : Reports no changes in dental health [Guns at Home] : no guns at home [TB Exposure] : is not being exposed to tuberculosis [Caregiver Concerns] : does not have caregiver concerns

## 2022-10-24 NOTE — PHYSICAL EXAM

## 2022-10-25 ENCOUNTER — TRANSCRIPTION ENCOUNTER (OUTPATIENT)
Age: 32
End: 2022-10-25

## 2022-10-25 LAB
25(OH)D3 SERPL-MCNC: 18.2 NG/ML
ALBUMIN SERPL ELPH-MCNC: 4.5 G/DL
ALP BLD-CCNC: 92 U/L
ALT SERPL-CCNC: 19 U/L
ANION GAP SERPL CALC-SCNC: 14 MMOL/L
AST SERPL-CCNC: 19 U/L
BASOPHILS # BLD AUTO: 0.02 K/UL
BASOPHILS NFR BLD AUTO: 0.3 %
BILIRUB SERPL-MCNC: 0.4 MG/DL
BUN SERPL-MCNC: 13 MG/DL
CALCIUM SERPL-MCNC: 8.9 MG/DL
CHLORIDE SERPL-SCNC: 102 MMOL/L
CHOLEST SERPL-MCNC: 151 MG/DL
CO2 SERPL-SCNC: 22 MMOL/L
CREAT SERPL-MCNC: 0.69 MG/DL
EGFR: 119 ML/MIN/1.73M2
EOSINOPHIL # BLD AUTO: 0.12 K/UL
EOSINOPHIL NFR BLD AUTO: 1.9 %
GLUCOSE SERPL-MCNC: 94 MG/DL
HCT VFR BLD CALC: 37.4 %
HDLC SERPL-MCNC: 39 MG/DL
HGB BLD-MCNC: 11.9 G/DL
IMM GRANULOCYTES NFR BLD AUTO: 0.3 %
LDLC SERPL CALC-MCNC: 70 MG/DL
LYMPHOCYTES # BLD AUTO: 2.02 K/UL
LYMPHOCYTES NFR BLD AUTO: 31.6 %
MAN DIFF?: NORMAL
MCHC RBC-ENTMCNC: 29.7 PG
MCHC RBC-ENTMCNC: 31.8 GM/DL
MCV RBC AUTO: 93.3 FL
MONOCYTES # BLD AUTO: 0.47 K/UL
MONOCYTES NFR BLD AUTO: 7.4 %
NEUTROPHILS # BLD AUTO: 3.74 K/UL
NEUTROPHILS NFR BLD AUTO: 58.5 %
NONHDLC SERPL-MCNC: 112 MG/DL
PLATELET # BLD AUTO: 236 K/UL
POTASSIUM SERPL-SCNC: 4.2 MMOL/L
PROT SERPL-MCNC: 7 G/DL
RBC # BLD: 4.01 M/UL
RBC # FLD: 14 %
SODIUM SERPL-SCNC: 138 MMOL/L
T3FREE SERPL-MCNC: 3.22 PG/ML
T4 FREE SERPL-MCNC: 0.9 NG/DL
TRIGL SERPL-MCNC: 209 MG/DL
TSH SERPL-ACNC: 0.83 UIU/ML
WBC # FLD AUTO: 6.39 K/UL

## 2022-10-25 NOTE — PATIENT PROFILE OB - FALL HARM RISK - UNIVERSAL INTERVENTIONS
Airway  Performed by: Renzo Samaniego  Authorized by: Randy Melvin MD     Final Airway Type:  Endotracheal airway  Final Endotracheal Airway*:  ETT  ETT Size (mm)*:  7.5  Cuff*:  Regular  Technique Used for Successful ETT Placement:  Direct laryngoscopy  Devices/Methods Used in Placement*:  Mask, Stylet, Soft Bite Block and Oral ETT (Teeth Guard)  Intubation Procedure*:  Preoxygenation, ETCO2, Atraumatic, Dentition Unchanged, Pharynx Clear and Cricoid Pressure  Insertion Site:  Oral  Blade Type*:  MAC  Blade Size*:  4  Cuff Volume (mL):  7  Measured from*:  Lips  Secured at (cm)*:  22  Placement Verified by: auscultation, capnometry and equal breath sounds    Glottic View*:  1 - full view of glottis  Attempts*:  1  Ventilation Between Attempts:  None  Number of Other Approaches Attempted:  0   Patient Identified, Procedure confirmed, Emergency equipment available and Safety protocols followed  Location:  OR  Urgency:  Elective  Difficult Airway: No    Indications for Airway Management:  Anesthesia and airway protection  Spontaneous Ventilation: absent    Sedation Level:  Anesthetized  Mask Difficulty Assessment:  1 - vent by mask  Performed by: ELIDIA Mendoza.  Start Time: 10/25/2022 9:53 AM    
Bed in lowest position, wheels locked, appropriate side rails in place/Call bell, personal items and telephone in reach/Instruct patient to call for assistance before getting out of bed or chair/Non-slip footwear when patient is out of bed/[place] to call system/Physically safe environment - no spills, clutter or unnecessary equipment/Purposeful Proactive Rounding/Room/bathroom lighting operational, light cord in reach

## 2022-11-03 DIAGNOSIS — E55.9 VITAMIN D DEFICIENCY, UNSPECIFIED: ICD-10-CM
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
## 2022-12-01 ENCOUNTER — APPOINTMENT (OUTPATIENT)
Dept: VASCULAR SURGERY | Facility: CLINIC | Age: 32
End: 2022-12-01

## 2022-12-01 VITALS
OXYGEN SATURATION: 99 % | SYSTOLIC BLOOD PRESSURE: 126 MMHG | HEIGHT: 63 IN | HEART RATE: 57 BPM | WEIGHT: 152 LBS | DIASTOLIC BLOOD PRESSURE: 55 MMHG | BODY MASS INDEX: 26.93 KG/M2

## 2022-12-01 DIAGNOSIS — I87.2 VENOUS INSUFFICIENCY (CHRONIC) (PERIPHERAL): ICD-10-CM

## 2022-12-01 PROCEDURE — 99214 OFFICE O/P EST MOD 30 MIN: CPT

## 2022-12-01 NOTE — ASSESSMENT
[FreeTextEntry1] : 31 year old woman with bilateral lower extremity symptomatic varicose veins\par \par Venous duplex reveals pathologic reflux in bilateral GSV > 2 seconds. There is also deep reflux in the right femoral vein\par She has failed conservative management strategies including compression stockings and exercise.\par I have discussed the option of GSV RF ablation. We have discussed the risks and benefits of the planned procedure and she will like to proceed.\par Will schedule for bilateral staged GSV ablation, right side first\par  [Arterial/Venous Disease] : arterial/venous disease [Foot care/Footwear] : foot care/footwear

## 2022-12-01 NOTE — PHYSICAL EXAM
[JVD] : no jugular venous distention  [Normal Breath Sounds] : Normal breath sounds [Normal Heart Sounds] : normal heart sounds [2+] : left 2+ [Ankle Swelling (On Exam)] : not present [Varicose Veins Of Lower Extremities] : bilaterally [Ankle Swelling On The Right] : mild [] : bilaterally [Ankle Swelling On The Left] : moderate [Abdomen Masses] : No abdominal masses [Abdomen Tenderness] : ~T ~M No abdominal tenderness [Alert] : alert [Oriented to Person] : oriented to person [Oriented to Place] : oriented to place [Calm] : calm [de-identified] : Well appearing [de-identified] : NCAT

## 2022-12-01 NOTE — HISTORY OF PRESENT ILLNESS
[FreeTextEntry1] : 31 year old female presents for evaluation of bilateral lower extremity pain. Symptoms have worsened over the last year. She complains of dull aching with standing or sitting, heaviness in her legs and itchiness of the veins around her ankle. She also has ankle swelling that worsens through the day. Symptoms are worse in her right leg.\par She regularly wears compression stockings without improvement in symptoms.\par No history of DVT.\par She denies claudication, rest pain or leg ulcers\par  [de-identified] : Patient became pregnant since her last visit and could not proceed with RFA.\par She returns for pain in medial RLE despite use of compression stockings and leg elevation.\par She works while standing for over 8 hours daily. She delivered 5 months ago.

## 2022-12-06 DIAGNOSIS — Z11.59 ENCOUNTER FOR SCREENING FOR OTHER VIRAL DISEASES: ICD-10-CM

## 2022-12-07 LAB — SARS-COV-2 N GENE NPH QL NAA+PROBE: NOT DETECTED

## 2022-12-13 ENCOUNTER — NON-APPOINTMENT (OUTPATIENT)
Age: 32
End: 2022-12-13

## 2022-12-18 ENCOUNTER — EMERGENCY (EMERGENCY)
Facility: HOSPITAL | Age: 32
LOS: 0 days | Discharge: HOME | End: 2022-12-18
Attending: STUDENT IN AN ORGANIZED HEALTH CARE EDUCATION/TRAINING PROGRAM
Payer: MEDICAID

## 2022-12-18 VITALS — WEIGHT: 119.93 LBS | HEIGHT: 63 IN

## 2022-12-18 VITALS
SYSTOLIC BLOOD PRESSURE: 112 MMHG | OXYGEN SATURATION: 99 % | DIASTOLIC BLOOD PRESSURE: 71 MMHG | RESPIRATION RATE: 18 BRPM | TEMPERATURE: 98 F | HEART RATE: 71 BPM

## 2022-12-18 DIAGNOSIS — Z90.49 ACQUIRED ABSENCE OF OTHER SPECIFIED PARTS OF DIGESTIVE TRACT: ICD-10-CM

## 2022-12-18 DIAGNOSIS — R07.89 OTHER CHEST PAIN: ICD-10-CM

## 2022-12-18 DIAGNOSIS — R00.2 PALPITATIONS: ICD-10-CM

## 2022-12-18 DIAGNOSIS — K80.60 CALCULUS OF GALLBLADDER AND BILE DUCT WITH CHOLECYSTITIS, UNSPECIFIED, WITHOUT OBSTRUCTION: ICD-10-CM

## 2022-12-18 DIAGNOSIS — Z90.49 ACQUIRED ABSENCE OF OTHER SPECIFIED PARTS OF DIGESTIVE TRACT: Chronic | ICD-10-CM

## 2022-12-18 LAB
ALBUMIN SERPL ELPH-MCNC: 3.8 G/DL — SIGNIFICANT CHANGE UP (ref 3.3–5)
ALP SERPL-CCNC: 86 U/L — SIGNIFICANT CHANGE UP (ref 40–120)
ALT FLD-CCNC: 18 U/L — SIGNIFICANT CHANGE UP (ref 12–78)
ANION GAP SERPL CALC-SCNC: 4 MMOL/L — LOW (ref 5–17)
AST SERPL-CCNC: 19 U/L — SIGNIFICANT CHANGE UP (ref 15–37)
BASOPHILS # BLD AUTO: 0.02 K/UL — SIGNIFICANT CHANGE UP (ref 0–0.2)
BASOPHILS NFR BLD AUTO: 0.3 % — SIGNIFICANT CHANGE UP (ref 0–2)
BILIRUB SERPL-MCNC: 0.8 MG/DL — SIGNIFICANT CHANGE UP (ref 0.2–1.2)
BUN SERPL-MCNC: 10 MG/DL — SIGNIFICANT CHANGE UP (ref 7–23)
CALCIUM SERPL-MCNC: 8.9 MG/DL — SIGNIFICANT CHANGE UP (ref 8.5–10.1)
CHLORIDE SERPL-SCNC: 106 MMOL/L — SIGNIFICANT CHANGE UP (ref 96–108)
CO2 SERPL-SCNC: 28 MMOL/L — SIGNIFICANT CHANGE UP (ref 22–31)
CREAT SERPL-MCNC: 0.61 MG/DL — SIGNIFICANT CHANGE UP (ref 0.5–1.3)
D DIMER BLD IA.RAPID-MCNC: <150 NG/ML DDU — SIGNIFICANT CHANGE UP
EGFR: 122 ML/MIN/1.73M2 — SIGNIFICANT CHANGE UP
EOSINOPHIL # BLD AUTO: 0.09 K/UL — SIGNIFICANT CHANGE UP (ref 0–0.5)
EOSINOPHIL NFR BLD AUTO: 1.3 % — SIGNIFICANT CHANGE UP (ref 0–6)
GLUCOSE SERPL-MCNC: 96 MG/DL — SIGNIFICANT CHANGE UP (ref 70–99)
HCG SERPL-ACNC: <1 MIU/ML — SIGNIFICANT CHANGE UP
HCT VFR BLD CALC: 38 % — SIGNIFICANT CHANGE UP (ref 34.5–45)
HGB BLD-MCNC: 12.7 G/DL — SIGNIFICANT CHANGE UP (ref 11.5–15.5)
IMM GRANULOCYTES NFR BLD AUTO: 0.1 % — SIGNIFICANT CHANGE UP (ref 0–0.9)
LYMPHOCYTES # BLD AUTO: 2.63 K/UL — SIGNIFICANT CHANGE UP (ref 1–3.3)
LYMPHOCYTES # BLD AUTO: 39.4 % — SIGNIFICANT CHANGE UP (ref 13–44)
MCHC RBC-ENTMCNC: 29.7 PG — SIGNIFICANT CHANGE UP (ref 27–34)
MCHC RBC-ENTMCNC: 33.4 GM/DL — SIGNIFICANT CHANGE UP (ref 32–36)
MCV RBC AUTO: 88.8 FL — SIGNIFICANT CHANGE UP (ref 80–100)
MONOCYTES # BLD AUTO: 0.39 K/UL — SIGNIFICANT CHANGE UP (ref 0–0.9)
MONOCYTES NFR BLD AUTO: 5.8 % — SIGNIFICANT CHANGE UP (ref 2–14)
NEUTROPHILS # BLD AUTO: 3.54 K/UL — SIGNIFICANT CHANGE UP (ref 1.8–7.4)
NEUTROPHILS NFR BLD AUTO: 53.1 % — SIGNIFICANT CHANGE UP (ref 43–77)
PLATELET # BLD AUTO: 228 K/UL — SIGNIFICANT CHANGE UP (ref 150–400)
POTASSIUM SERPL-MCNC: 3.8 MMOL/L — SIGNIFICANT CHANGE UP (ref 3.5–5.3)
POTASSIUM SERPL-SCNC: 3.8 MMOL/L — SIGNIFICANT CHANGE UP (ref 3.5–5.3)
PROT SERPL-MCNC: 7.6 GM/DL — SIGNIFICANT CHANGE UP (ref 6–8.3)
RBC # BLD: 4.28 M/UL — SIGNIFICANT CHANGE UP (ref 3.8–5.2)
RBC # FLD: 12.9 % — SIGNIFICANT CHANGE UP (ref 10.3–14.5)
SODIUM SERPL-SCNC: 138 MMOL/L — SIGNIFICANT CHANGE UP (ref 135–145)
TROPONIN I, HIGH SENSITIVITY RESULT: 7.91 NG/L — SIGNIFICANT CHANGE UP
WBC # BLD: 6.68 K/UL — SIGNIFICANT CHANGE UP (ref 3.8–10.5)
WBC # FLD AUTO: 6.68 K/UL — SIGNIFICANT CHANGE UP (ref 3.8–10.5)

## 2022-12-18 PROCEDURE — 84702 CHORIONIC GONADOTROPIN TEST: CPT

## 2022-12-18 PROCEDURE — 99285 EMERGENCY DEPT VISIT HI MDM: CPT

## 2022-12-18 PROCEDURE — 71046 X-RAY EXAM CHEST 2 VIEWS: CPT | Mod: 26

## 2022-12-18 PROCEDURE — 99285 EMERGENCY DEPT VISIT HI MDM: CPT | Mod: 25

## 2022-12-18 PROCEDURE — 71046 X-RAY EXAM CHEST 2 VIEWS: CPT

## 2022-12-18 PROCEDURE — 93010 ELECTROCARDIOGRAM REPORT: CPT

## 2022-12-18 PROCEDURE — 85025 COMPLETE CBC W/AUTO DIFF WBC: CPT

## 2022-12-18 PROCEDURE — 84484 ASSAY OF TROPONIN QUANT: CPT

## 2022-12-18 PROCEDURE — 93005 ELECTROCARDIOGRAM TRACING: CPT

## 2022-12-18 PROCEDURE — 80053 COMPREHEN METABOLIC PANEL: CPT

## 2022-12-18 PROCEDURE — 96374 THER/PROPH/DIAG INJ IV PUSH: CPT

## 2022-12-18 PROCEDURE — 36415 COLL VENOUS BLD VENIPUNCTURE: CPT

## 2022-12-18 PROCEDURE — 85379 FIBRIN DEGRADATION QUANT: CPT

## 2022-12-18 RX ORDER — KETOROLAC TROMETHAMINE 30 MG/ML
15 SYRINGE (ML) INJECTION ONCE
Refills: 0 | Status: DISCONTINUED | OUTPATIENT
Start: 2022-12-18 | End: 2022-12-18

## 2022-12-18 RX ADMIN — Medication 15 MILLIGRAM(S): at 18:59

## 2022-12-18 NOTE — ED PROVIDER NOTE - NS ED ROS FT
GENERAL: no fever, no chills, no weight loss  EYES: no change in vision, no irritation, no discharge, no redness, no pain  HEENT: no trouble swallowing or speaking, no throat pain, no ear pain  CARDIAC: +chest pain, +palpitations   PULMONARY: no cough, no shortness of breath, no wheezing  GI: no abdominal pain, no nausea, no vomiting, no diarrhea, no constipation, no melena, no hematochezia, no hematemesis  : no changes in urination, no dysuria, no frequency, no hematuria, no discharge  SKIN: no rashes  NEURO: no headache, no numbness, no weakness  MSK: no joint pain, no muscle pain, no back pain, no calf pain

## 2022-12-18 NOTE — ED PROVIDER NOTE - NSCAREINITIATED _GEN_ER
Elias Castro(Attending)
Alert and oriented to person, place and time, memory intact, behavior appropriate to situation, PERRL.

## 2022-12-18 NOTE — ED PROVIDER NOTE - PATIENT PORTAL LINK FT
You can access the FollowMyHealth Patient Portal offered by [facility] by registering at the following website: [email or web address]. By joining [employer]’s FollowMyHealth portal, you will also be able to view your health information using other applications (apps) compatible with our system.

## 2022-12-18 NOTE — ED PROVIDER NOTE - OBJECTIVE STATEMENT
32-year-old female no significant past medical history presents to the emergency department for left-sided chest pain.  Pain was sudden onset yesterday afternoon while at rest.  Associated with palpitations.  Pain has fluctuating severity but will always be present.  Is located in the left side of the chest, sharp in nature, feels like it radiates up into the neck and left arm.  She thought it was related to carrying her child on the left side but has no pain when she moves her left arm.  Has Mirena IUD, no other OCP use.  No recent surgeries, no recent travel, no history of DVT/PE.  She denies fever, chills, cough, shortness of breath, or back pain.

## 2022-12-18 NOTE — ED PROVIDER NOTE - ATTENDING APP SHARED VISIT CONTRIBUTION OF CARE
I, Elias Castro DO, personally saw the patient with ACP.  I have personally performed a face to face diagnostic evaluation on this patient.  I have reviewed the ACP note and agree with the history, exam, and plan of care, except as noted.   The initial assessment was performed by myself and then the patient was handed off to the ACP. The patient was followed and re-evaluated by the ACP. All labs, imaging and procedures were evaluated and performed by the ACP and I was available for consultation if any questions in the patients care came up.

## 2022-12-18 NOTE — ED PROVIDER NOTE - COVID-19 ORDERING FACILITY
----- Message from Gilmar Grant MD sent at 8/20/2021  5:47 PM EDT -----  Call patient with lab result-Cologuard neg- repeat 3 years [facility]

## 2022-12-18 NOTE — ED ADULT NURSE NOTE - NSIMPLEMENTINTERV_GEN_ALL_ED
Implemented All Universal Safety Interventions:  [place] to call system. Call bell, personal items and telephone within reach. Instruct patient to call for assistance. Room bathroom lighting operational. Non-slip footwear when patient is off stretcher. Physically safe environment: no spills, clutter or unnecessary equipment. Stretcher in lowest position, wheels locked, appropriate side rails in place.

## 2022-12-18 NOTE — ED PROVIDER NOTE - CLINICAL SUMMARY MEDICAL DECISION MAKING FREE TEXT BOX
32-year-old female presents with left-sided chest pain x1 day associated with palpitations.  No aggravating or relieving factors.  Low risk PERC score however does have Mirena IUD.  Less likely ACS, will evaluate for PE with D-dimer, EKG, chest x-ray, pain management, reassess for dispo.

## 2022-12-18 NOTE — ED PROVIDER NOTE - PROGRESS NOTE DETAILS
Patient seen and evaluated, ED attending note and orders reviewed, will continue with patient follow up and care -Cassi Gonzalez PA-C labs WNL, trop negative, CXR with NAD, pt feeling well, likely msk will dc home with PMD f/u, return precautions given  Cassi Gonzalez PA-C

## 2022-12-18 NOTE — ED ADULT NURSE NOTE - OBJECTIVE STATEMENT
pt arrives to ED complaining of chest pain starting yesterday. Pain described as "constant pressure." denies medical history. alert and oriented x 4. ambulatory/.

## 2023-01-19 ENCOUNTER — APPOINTMENT (OUTPATIENT)
Dept: FAMILY MEDICINE | Facility: CLINIC | Age: 33
End: 2023-01-19
Payer: MEDICAID

## 2023-01-19 VITALS
DIASTOLIC BLOOD PRESSURE: 62 MMHG | SYSTOLIC BLOOD PRESSURE: 98 MMHG | HEART RATE: 67 BPM | WEIGHT: 152 LBS | TEMPERATURE: 98.2 F | HEIGHT: 63 IN | BODY MASS INDEX: 26.93 KG/M2 | OXYGEN SATURATION: 98 %

## 2023-01-19 PROCEDURE — 99213 OFFICE O/P EST LOW 20 MIN: CPT

## 2023-01-30 NOTE — PLAN
[FreeTextEntry1] : Counseling provided.\par Patient gets routine endoscopies recommended to follow-up when she is due next.

## 2023-01-30 NOTE — HISTORY OF PRESENT ILLNESS
[FreeTextEntry8] : SHANNEN DO is a 32 year old female presenting upper right quadrant pain for 1 day.  Was persistent but has improved.  Patient has history of GERD.  Was taking Pepcid without much relief.  Unsure if it is abdominal bloating related to gas.  Denies any fever or nausea or vomiting.  Bowel movements have been normal.

## 2023-02-16 ENCOUNTER — APPOINTMENT (OUTPATIENT)
Dept: FAMILY MEDICINE | Facility: CLINIC | Age: 33
End: 2023-02-16
Payer: MEDICAID

## 2023-02-16 ENCOUNTER — NON-APPOINTMENT (OUTPATIENT)
Age: 33
End: 2023-02-16

## 2023-02-16 VITALS
SYSTOLIC BLOOD PRESSURE: 112 MMHG | HEART RATE: 68 BPM | OXYGEN SATURATION: 98 % | TEMPERATURE: 97.9 F | BODY MASS INDEX: 26.93 KG/M2 | WEIGHT: 152 LBS | DIASTOLIC BLOOD PRESSURE: 70 MMHG

## 2023-02-16 DIAGNOSIS — R00.2 PALPITATIONS: ICD-10-CM

## 2023-02-16 PROCEDURE — 99213 OFFICE O/P EST LOW 20 MIN: CPT | Mod: 25

## 2023-02-16 PROCEDURE — 93000 ELECTROCARDIOGRAM COMPLETE: CPT

## 2023-02-16 NOTE — HISTORY OF PRESENT ILLNESS
[FreeTextEntry8] : possible palpitations anxiety, stressed out, lightheadedness, heart races, feels  like she has to catch her breath\par C/o x 1 1/2 - 2 weeks\par \par

## 2023-02-16 NOTE — PLAN
[FreeTextEntry1] : Rest\par Relaxation techniques\par Check pulse the next time it occurs\par avoid caffeine, appetite suppressants\par give it a little time.  If worsens or  is persistent may try mild SSRI as an anxiolytic agent\par Reassured

## 2023-03-10 ENCOUNTER — APPOINTMENT (OUTPATIENT)
Dept: GASTROENTEROLOGY | Facility: CLINIC | Age: 33
End: 2023-03-10

## 2023-05-23 ENCOUNTER — APPOINTMENT (OUTPATIENT)
Dept: FAMILY MEDICINE | Facility: CLINIC | Age: 33
End: 2023-05-23
Payer: MEDICAID

## 2023-05-23 VITALS
DIASTOLIC BLOOD PRESSURE: 72 MMHG | WEIGHT: 152 LBS | HEART RATE: 72 BPM | OXYGEN SATURATION: 100 % | SYSTOLIC BLOOD PRESSURE: 120 MMHG | BODY MASS INDEX: 26.93 KG/M2 | HEIGHT: 63 IN

## 2023-05-23 DIAGNOSIS — S39.012A STRAIN OF MUSCLE, FASCIA AND TENDON OF LOWER BACK, INITIAL ENCOUNTER: ICD-10-CM

## 2023-05-23 PROCEDURE — 99213 OFFICE O/P EST LOW 20 MIN: CPT

## 2023-05-23 NOTE — HISTORY OF PRESENT ILLNESS
[FreeTextEntry8] : pt is here for lower back pain\par C/o x 1-2 month \par Has been seeing chiropractor\par No radicular pain. No obvious trauma\par No neuro deficitis.

## 2023-05-23 NOTE — PLAN
[FreeTextEntry1] : warm compress\par Advil\par No heavy lifting\par PT referral\par If not improved LS spine xray

## 2023-05-23 NOTE — PHYSICAL EXAM
[Normal] : no joint swelling and grossly normal strength and tone [de-identified] : FROM  with mild pain on extension of spine. No pain on flexion, lateral flexion, or rotation.

## 2023-05-25 ENCOUNTER — APPOINTMENT (OUTPATIENT)
Dept: RADIOLOGY | Facility: CLINIC | Age: 33
End: 2023-05-25
Payer: MEDICAID

## 2023-05-25 ENCOUNTER — OUTPATIENT (OUTPATIENT)
Dept: OUTPATIENT SERVICES | Facility: HOSPITAL | Age: 33
LOS: 1 days | End: 2023-05-25
Payer: MEDICAID

## 2023-05-25 DIAGNOSIS — Z90.49 ACQUIRED ABSENCE OF OTHER SPECIFIED PARTS OF DIGESTIVE TRACT: Chronic | ICD-10-CM

## 2023-05-25 DIAGNOSIS — S39.012A STRAIN OF MUSCLE, FASCIA AND TENDON OF LOWER BACK, INITIAL ENCOUNTER: ICD-10-CM

## 2023-05-25 PROCEDURE — 72110 X-RAY EXAM L-2 SPINE 4/>VWS: CPT | Mod: 26

## 2023-05-25 PROCEDURE — 72110 X-RAY EXAM L-2 SPINE 4/>VWS: CPT

## 2023-06-12 ENCOUNTER — EMERGENCY (EMERGENCY)
Facility: HOSPITAL | Age: 33
LOS: 0 days | Discharge: HOME | End: 2023-06-12
Attending: EMERGENCY MEDICINE
Payer: MEDICAID

## 2023-06-12 VITALS
TEMPERATURE: 98 F | DIASTOLIC BLOOD PRESSURE: 78 MMHG | OXYGEN SATURATION: 100 % | HEART RATE: 68 BPM | SYSTOLIC BLOOD PRESSURE: 120 MMHG | RESPIRATION RATE: 18 BRPM

## 2023-06-12 VITALS — HEIGHT: 62 IN | WEIGHT: 147.93 LBS

## 2023-06-12 DIAGNOSIS — M54.50 LOW BACK PAIN, UNSPECIFIED: ICD-10-CM

## 2023-06-12 DIAGNOSIS — Z90.49 ACQUIRED ABSENCE OF OTHER SPECIFIED PARTS OF DIGESTIVE TRACT: ICD-10-CM

## 2023-06-12 DIAGNOSIS — Z90.49 ACQUIRED ABSENCE OF OTHER SPECIFIED PARTS OF DIGESTIVE TRACT: Chronic | ICD-10-CM

## 2023-06-12 DIAGNOSIS — R10.31 RIGHT LOWER QUADRANT PAIN: ICD-10-CM

## 2023-06-12 DIAGNOSIS — Z87.59 PERSONAL HISTORY OF OTHER COMPLICATIONS OF PREGNANCY, CHILDBIRTH AND THE PUERPERIUM: ICD-10-CM

## 2023-06-12 DIAGNOSIS — R68.83 CHILLS (WITHOUT FEVER): ICD-10-CM

## 2023-06-12 LAB
ALBUMIN SERPL ELPH-MCNC: 3.8 G/DL — SIGNIFICANT CHANGE UP (ref 3.3–5)
ALP SERPL-CCNC: 88 U/L — SIGNIFICANT CHANGE UP (ref 40–120)
ALT FLD-CCNC: 18 U/L — SIGNIFICANT CHANGE UP (ref 12–78)
ANION GAP SERPL CALC-SCNC: 2 MMOL/L — LOW (ref 5–17)
APPEARANCE UR: CLEAR — SIGNIFICANT CHANGE UP
APTT BLD: 32.7 SEC — SIGNIFICANT CHANGE UP (ref 27.5–35.5)
AST SERPL-CCNC: 11 U/L — LOW (ref 15–37)
BACTERIA # UR AUTO: NEGATIVE — SIGNIFICANT CHANGE UP
BASOPHILS # BLD AUTO: 0.01 K/UL — SIGNIFICANT CHANGE UP (ref 0–0.2)
BASOPHILS NFR BLD AUTO: 0.2 % — SIGNIFICANT CHANGE UP (ref 0–2)
BILIRUB SERPL-MCNC: 0.9 MG/DL — SIGNIFICANT CHANGE UP (ref 0.2–1.2)
BILIRUB UR-MCNC: NEGATIVE — SIGNIFICANT CHANGE UP
BUN SERPL-MCNC: 12 MG/DL — SIGNIFICANT CHANGE UP (ref 7–23)
CALCIUM SERPL-MCNC: 9.3 MG/DL — SIGNIFICANT CHANGE UP (ref 8.5–10.1)
CHLORIDE SERPL-SCNC: 108 MMOL/L — SIGNIFICANT CHANGE UP (ref 96–108)
CO2 SERPL-SCNC: 30 MMOL/L — SIGNIFICANT CHANGE UP (ref 22–31)
COLOR SPEC: YELLOW — SIGNIFICANT CHANGE UP
CREAT SERPL-MCNC: 0.65 MG/DL — SIGNIFICANT CHANGE UP (ref 0.5–1.3)
DIFF PNL FLD: ABNORMAL
EGFR: 120 ML/MIN/1.73M2 — SIGNIFICANT CHANGE UP
EOSINOPHIL # BLD AUTO: 0.07 K/UL — SIGNIFICANT CHANGE UP (ref 0–0.5)
EOSINOPHIL NFR BLD AUTO: 1.3 % — SIGNIFICANT CHANGE UP (ref 0–6)
EPI CELLS # UR: SIGNIFICANT CHANGE UP
GLUCOSE SERPL-MCNC: 94 MG/DL — SIGNIFICANT CHANGE UP (ref 70–99)
GLUCOSE UR QL: NEGATIVE — SIGNIFICANT CHANGE UP
HCT VFR BLD CALC: 39.1 % — SIGNIFICANT CHANGE UP (ref 34.5–45)
HGB BLD-MCNC: 12.7 G/DL — SIGNIFICANT CHANGE UP (ref 11.5–15.5)
IMM GRANULOCYTES NFR BLD AUTO: 0.2 % — SIGNIFICANT CHANGE UP (ref 0–0.9)
INR BLD: 1.06 RATIO — SIGNIFICANT CHANGE UP (ref 0.88–1.16)
KETONES UR-MCNC: NEGATIVE — SIGNIFICANT CHANGE UP
LEUKOCYTE ESTERASE UR-ACNC: NEGATIVE — SIGNIFICANT CHANGE UP
LYMPHOCYTES # BLD AUTO: 2.01 K/UL — SIGNIFICANT CHANGE UP (ref 1–3.3)
LYMPHOCYTES # BLD AUTO: 37.8 % — SIGNIFICANT CHANGE UP (ref 13–44)
MCHC RBC-ENTMCNC: 29.8 PG — SIGNIFICANT CHANGE UP (ref 27–34)
MCHC RBC-ENTMCNC: 32.5 GM/DL — SIGNIFICANT CHANGE UP (ref 32–36)
MCV RBC AUTO: 91.8 FL — SIGNIFICANT CHANGE UP (ref 80–100)
MONOCYTES # BLD AUTO: 0.34 K/UL — SIGNIFICANT CHANGE UP (ref 0–0.9)
MONOCYTES NFR BLD AUTO: 6.4 % — SIGNIFICANT CHANGE UP (ref 2–14)
NEUTROPHILS # BLD AUTO: 2.88 K/UL — SIGNIFICANT CHANGE UP (ref 1.8–7.4)
NEUTROPHILS NFR BLD AUTO: 54.1 % — SIGNIFICANT CHANGE UP (ref 43–77)
NITRITE UR-MCNC: NEGATIVE — SIGNIFICANT CHANGE UP
PH UR: 6.5 — SIGNIFICANT CHANGE UP (ref 5–8)
PLATELET # BLD AUTO: 213 K/UL — SIGNIFICANT CHANGE UP (ref 150–400)
POTASSIUM SERPL-MCNC: 3.7 MMOL/L — SIGNIFICANT CHANGE UP (ref 3.5–5.3)
POTASSIUM SERPL-SCNC: 3.7 MMOL/L — SIGNIFICANT CHANGE UP (ref 3.5–5.3)
PROT SERPL-MCNC: 7.6 GM/DL — SIGNIFICANT CHANGE UP (ref 6–8.3)
PROT UR-MCNC: NEGATIVE — SIGNIFICANT CHANGE UP
PROTHROM AB SERPL-ACNC: 12.3 SEC — SIGNIFICANT CHANGE UP (ref 10.5–13.4)
RBC # BLD: 4.26 M/UL — SIGNIFICANT CHANGE UP (ref 3.8–5.2)
RBC # FLD: 13.7 % — SIGNIFICANT CHANGE UP (ref 10.3–14.5)
RBC CASTS # UR COMP ASSIST: SIGNIFICANT CHANGE UP /HPF (ref 0–4)
SODIUM SERPL-SCNC: 140 MMOL/L — SIGNIFICANT CHANGE UP (ref 135–145)
SP GR SPEC: 1.01 — SIGNIFICANT CHANGE UP (ref 1.01–1.02)
UROBILINOGEN FLD QL: NEGATIVE — SIGNIFICANT CHANGE UP
WBC # BLD: 5.32 K/UL — SIGNIFICANT CHANGE UP (ref 3.8–10.5)
WBC # FLD AUTO: 5.32 K/UL — SIGNIFICANT CHANGE UP (ref 3.8–10.5)
WBC UR QL: SIGNIFICANT CHANGE UP /HPF (ref 0–5)

## 2023-06-12 PROCEDURE — 85730 THROMBOPLASTIN TIME PARTIAL: CPT

## 2023-06-12 PROCEDURE — 85025 COMPLETE CBC W/AUTO DIFF WBC: CPT

## 2023-06-12 PROCEDURE — 80053 COMPREHEN METABOLIC PANEL: CPT

## 2023-06-12 PROCEDURE — 36415 COLL VENOUS BLD VENIPUNCTURE: CPT

## 2023-06-12 PROCEDURE — 99284 EMERGENCY DEPT VISIT MOD MDM: CPT

## 2023-06-12 PROCEDURE — 87086 URINE CULTURE/COLONY COUNT: CPT

## 2023-06-12 PROCEDURE — 85610 PROTHROMBIN TIME: CPT

## 2023-06-12 PROCEDURE — 74177 CT ABD & PELVIS W/CONTRAST: CPT | Mod: 26,MA

## 2023-06-12 PROCEDURE — 99284 EMERGENCY DEPT VISIT MOD MDM: CPT | Mod: 25

## 2023-06-12 PROCEDURE — 81001 URINALYSIS AUTO W/SCOPE: CPT

## 2023-06-12 PROCEDURE — 74177 CT ABD & PELVIS W/CONTRAST: CPT | Mod: MA

## 2023-06-12 NOTE — ED ADULT TRIAGE NOTE - CHIEF COMPLAINT QUOTE
Pt arrives to ED complaining of abdominal pain starting Saturday radiating to RLQ. Denies nausea, vomiting, diarrhea. denies medical history.

## 2023-06-12 NOTE — ED PROVIDER NOTE - PATIENT PORTAL LINK FT
You can access the FollowMyHealth Patient Portal offered by [facility] by registering at the following website: [email or web address]. By joining [employer]’s FollowMyHealth portal, you will also be able to view your health information using other applications (apps) compatible with our system.

## 2023-06-12 NOTE — ED PROVIDER NOTE - OBJECTIVE STATEMENT
33F Hx cholecystectomy, prior ectopic pregnancy c/o low back pain, RLQ pain since Saturday evening. Some chills.  "Throbbing"

## 2023-06-12 NOTE — ED ADULT NURSE NOTE - NS ED NURSE IV DC DT
Second attempt to contact pt to assist to schedule appt with Dr. Sadler for evaluation and chronic pain management.  No answer, message left, awaiting callback.      12-Jun-2023 11:10

## 2023-06-12 NOTE — ED ADULT NURSE NOTE - NSFALLUNIVINTERV_ED_ALL_ED
Bed/Stretcher in lowest position, wheels locked, appropriate side rails in place/Call bell, personal items and telephone in reach/Instruct patient to call for assistance before getting out of bed/chair/stretcher/Non-slip footwear applied when patient is off stretcher/[place] to call system/Physically safe environment - no spills, clutter or unnecessary equipment/Purposeful proactive rounding/Room/bathroom lighting operational, light cord in reach

## 2023-06-12 NOTE — ED ADULT NURSE NOTE - OBJECTIVE STATEMENT
Pt arrives to ED complaining of abdominal pain starting Saturday radiating to RLQ. Denies nausea, vomiting, diarrhea. denies medical history. Pt endorses back pain as well. Last BM yesterday, pt reports constipation. Pt has been able to tolerate food.  Pt is alert, oriented and answering questions appropriately.

## 2023-06-13 LAB
CULTURE RESULTS: SIGNIFICANT CHANGE UP
SPECIMEN SOURCE: SIGNIFICANT CHANGE UP

## 2023-06-14 ENCOUNTER — APPOINTMENT (OUTPATIENT)
Dept: VASCULAR SURGERY | Facility: CLINIC | Age: 33
End: 2023-06-14

## 2023-06-19 ENCOUNTER — APPOINTMENT (OUTPATIENT)
Dept: FAMILY MEDICINE | Facility: CLINIC | Age: 33
End: 2023-06-19
Payer: MEDICAID

## 2023-06-19 VITALS
WEIGHT: 152 LBS | BODY MASS INDEX: 26.93 KG/M2 | SYSTOLIC BLOOD PRESSURE: 112 MMHG | HEART RATE: 69 BPM | DIASTOLIC BLOOD PRESSURE: 64 MMHG | OXYGEN SATURATION: 99 % | TEMPERATURE: 98.1 F

## 2023-06-19 DIAGNOSIS — R53.82 CHRONIC FATIGUE, UNSPECIFIED: ICD-10-CM

## 2023-06-19 DIAGNOSIS — E78.1 PURE HYPERGLYCERIDEMIA: ICD-10-CM

## 2023-06-19 DIAGNOSIS — L65.9 NONSCARRING HAIR LOSS, UNSPECIFIED: ICD-10-CM

## 2023-06-19 PROCEDURE — 99213 OFFICE O/P EST LOW 20 MIN: CPT | Mod: 25

## 2023-06-19 NOTE — HISTORY OF PRESENT ILLNESS
[FreeTextEntry8] : pt is here for fasting blood work consult\par Hair loss, high trigylycerides at last visit. \par Some fatigue\par Menses regular. Cold all the  time. No weight gain. Some times fees hot ant night\par No skin issues. No eye changes. No bloating or fluid retention

## 2023-06-21 ENCOUNTER — TRANSCRIPTION ENCOUNTER (OUTPATIENT)
Age: 33
End: 2023-06-21

## 2023-06-21 LAB
25(OH)D3 SERPL-MCNC: 16.1 NG/ML
ALBUMIN SERPL ELPH-MCNC: 4.7 G/DL
ALP BLD-CCNC: 80 U/L
ALT SERPL-CCNC: 10 U/L
ANION GAP SERPL CALC-SCNC: 10 MMOL/L
AST SERPL-CCNC: 15 U/L
BILIRUB SERPL-MCNC: 0.4 MG/DL
BUN SERPL-MCNC: 9 MG/DL
CALCIUM SERPL-MCNC: 9.6 MG/DL
CHLORIDE SERPL-SCNC: 103 MMOL/L
CHOLEST SERPL-MCNC: 136 MG/DL
CO2 SERPL-SCNC: 25 MMOL/L
CREAT SERPL-MCNC: 0.73 MG/DL
EGFR: 112 ML/MIN/1.73M2
FOLATE SERPL-MCNC: 10.7 NG/ML
GLUCOSE SERPL-MCNC: 93 MG/DL
HDLC SERPL-MCNC: 47 MG/DL
LDLC SERPL CALC-MCNC: 74 MG/DL
NONHDLC SERPL-MCNC: 89 MG/DL
POTASSIUM SERPL-SCNC: 4.2 MMOL/L
PROT SERPL-MCNC: 7.4 G/DL
SODIUM SERPL-SCNC: 138 MMOL/L
T3FREE SERPL-MCNC: 2.71 PG/ML
T4 FREE SERPL-MCNC: 1.1 NG/DL
TRIGL SERPL-MCNC: 78 MG/DL
TSH SERPL-ACNC: 1.38 UIU/ML
VIT B12 SERPL-MCNC: 379 PG/ML

## 2023-06-23 ENCOUNTER — APPOINTMENT (OUTPATIENT)
Dept: GASTROENTEROLOGY | Facility: CLINIC | Age: 33
End: 2023-06-23
Payer: MEDICAID

## 2023-06-23 VITALS
HEIGHT: 63 IN | WEIGHT: 146 LBS | SYSTOLIC BLOOD PRESSURE: 114 MMHG | BODY MASS INDEX: 25.87 KG/M2 | HEART RATE: 76 BPM | DIASTOLIC BLOOD PRESSURE: 70 MMHG

## 2023-06-23 DIAGNOSIS — R93.5 ABNORMAL FINDINGS ON DIAGNOSTIC IMAGING OF OTHER ABDOMINAL REGIONS, INCLUDING RETROPERITONEUM: ICD-10-CM

## 2023-06-23 DIAGNOSIS — R10.30 LOWER ABDOMINAL PAIN, UNSPECIFIED: ICD-10-CM

## 2023-06-23 PROCEDURE — 99204 OFFICE O/P NEW MOD 45 MIN: CPT

## 2023-06-24 PROBLEM — R10.30 LOWER ABDOMINAL PAIN: Status: ACTIVE | Noted: 2023-06-24

## 2023-06-24 PROBLEM — R93.5 ABNORMAL CT OF THE ABDOMEN: Status: ACTIVE | Noted: 2023-06-24

## 2023-06-24 NOTE — ASSESSMENT
[FreeTextEntry1] : 34yo female with gerd, abdominal pain\par \par My review of ct scan shows extensive colonic stool\par think her pain was from relative constipation\par will follow BM and add miralax prn\par \par will continue omeprazole on demand\par \par given years of gerd despite courses PPI, will check egd\par Risks and benefits of procedure(s) discussed with patient in detail, including but not limited to, perforation, bleeding, reaction to anesthesia, missed lesions.\par

## 2023-06-24 NOTE — HISTORY OF PRESENT ILLNESS
[FreeTextEntry1] : 32yo female with GERD< abdominal pain\par \par She has had gerd for years\par She notes that omeprazole helps but tries to stick to famotidine\par \par She had severe abdominal pain and went to the ER 2 weeks ago\par I reviewed labs and ct scan described to her as normal\par \par ct scan with increased stool

## 2023-06-24 NOTE — PHYSICAL EXAM
[Alert] : alert [Normal Voice/Communication] : normal voice/communication [Healthy Appearing] : healthy appearing [Sclera] : the sclera and conjunctiva were normal [No Acute Distress] : no acute distress [Hearing Threshold Finger Rub Not [place]] : hearing was normal [Normal Lips/Gums] : the lips and gums were normal [Oropharynx] : the oropharynx was normal [Normal Appearance] : the appearance of the neck was normal [No Neck Mass] : no neck mass was observed [No Respiratory Distress] : no respiratory distress [No Acc Muscle Use] : no accessory muscle use [Respiration, Rhythm And Depth] : normal respiratory rhythm and effort [Heart Rate And Rhythm] : heart rate was normal and rhythm regular [Auscultation Breath Sounds / Voice Sounds] : lungs were clear to auscultation bilaterally [Normal S1, S2] : normal S1 and S2 [Murmurs] : no murmurs [Bowel Sounds] : normal bowel sounds [Abdomen Tenderness] : non-tender [No Masses] : no abdominal mass palpated [] : no hepatosplenomegaly [Abdomen Soft] : soft [Oriented To Time, Place, And Person] : oriented to person, place, and time

## 2023-08-09 ENCOUNTER — APPOINTMENT (OUTPATIENT)
Dept: FAMILY MEDICINE | Facility: CLINIC | Age: 33
End: 2023-08-09
Payer: COMMERCIAL

## 2023-08-09 VITALS
DIASTOLIC BLOOD PRESSURE: 75 MMHG | OXYGEN SATURATION: 98 % | HEART RATE: 98 BPM | TEMPERATURE: 97.6 F | SYSTOLIC BLOOD PRESSURE: 110 MMHG

## 2023-08-09 DIAGNOSIS — R39.15 URGENCY OF URINATION: ICD-10-CM

## 2023-08-09 DIAGNOSIS — K21.9 GASTRO-ESOPHAGEAL REFLUX DISEASE W/OUT ESOPHAGITIS: ICD-10-CM

## 2023-08-09 PROCEDURE — 99214 OFFICE O/P EST MOD 30 MIN: CPT | Mod: 25

## 2023-08-09 PROCEDURE — 81003 URINALYSIS AUTO W/O SCOPE: CPT | Mod: QW

## 2023-08-09 RX ORDER — NITROFURANTOIN (MONOHYDRATE/MACROCRYSTALS) 25; 75 MG/1; MG/1
100 CAPSULE ORAL
Qty: 14 | Refills: 0 | Status: ACTIVE | COMMUNITY
Start: 2023-08-09 | End: 1900-01-01

## 2023-08-09 NOTE — HISTORY OF PRESENT ILLNESS
[FreeTextEntry8] : SHANNEN DO is a 33 year old female presenting with urinary urgency and worsening reflux.  Denies fever/chills.

## 2023-08-13 LAB
APPEARANCE: CLEAR
BACTERIA UR CULT: NORMAL
BACTERIA: NEGATIVE /HPF
BILIRUBIN URINE: NEGATIVE
BLOOD URINE: NEGATIVE
CAST: 0 /LPF
COLOR: YELLOW
EPITHELIAL CELLS: 0 /HPF
GLUCOSE QUALITATIVE U: NEGATIVE MG/DL
KETONES URINE: NEGATIVE MG/DL
LEUKOCYTE ESTERASE URINE: NEGATIVE
MICROSCOPIC-UA: NORMAL
NITRITE URINE: NEGATIVE
PH URINE: 6
PROTEIN URINE: NEGATIVE MG/DL
RED BLOOD CELLS URINE: 0 /HPF
SPECIFIC GRAVITY URINE: 1.01
UROBILINOGEN URINE: 0.2 MG/DL
WHITE BLOOD CELLS URINE: 0 /HPF

## 2023-09-07 ENCOUNTER — APPOINTMENT (OUTPATIENT)
Dept: OBGYN | Facility: CLINIC | Age: 33
End: 2023-09-07
Payer: COMMERCIAL

## 2023-09-07 VITALS
HEIGHT: 63 IN | SYSTOLIC BLOOD PRESSURE: 100 MMHG | BODY MASS INDEX: 25.69 KG/M2 | WEIGHT: 145 LBS | DIASTOLIC BLOOD PRESSURE: 70 MMHG

## 2023-09-07 DIAGNOSIS — Z01.419 ENCOUNTER FOR GYNECOLOGICAL EXAMINATION (GENERAL) (ROUTINE) W/OUT ABNORMAL FINDINGS: ICD-10-CM

## 2023-09-07 PROCEDURE — 99395 PREV VISIT EST AGE 18-39: CPT

## 2023-09-08 ENCOUNTER — LABORATORY RESULT (OUTPATIENT)
Age: 33
End: 2023-09-08

## 2023-09-08 NOTE — HISTORY OF PRESENT ILLNESS
[FreeTextEntry1] : HPI: Patient is a 34yo female presenting for her well woman exam.  Patient is without complaints today.  ROS: neg unless specified in HPI  OB Hx:   x 3  Gyn Hx:  Menses: monthly No known fibroids, ovarian cysts, or other gynecologic problems Pap: none in EMR Contraception: Kyleena IUD  PMH: GERD PSH: Cholecystectomy Meds: famotidine and omeprazole All: NKDA FH: Denies any breast, gynecologic, or GI cancers in the family SH: neg x 3  Additional Exam:   Gyn: Normal appearing external genitalia, normal appearing vagina and cervix, no CMT, bimanual with normal sized mobile uterus, no fixed adnexal masses or tenderness  Breast: No lymphadenopathy in the neck, chest wall, bilateral supraclavicular, infraclavicular, and bilateral axillary areas.  No overt asymmetry in bilateral breast contour with normal appearing skin and normal appearing nipple areolar complex bilaterally.  No nipple discharge expressed.  A/P: 34yo female here for annual exam Gyn Screening: - Pap: cytology + automatic HR HPV sent - Contraception: continue Kyleena IUD - Recommended HPV vaccine  - STI screening: GC/CT/Trich  Breast Screening:  - Discussed self-breast exam - Clinical breast exam performed  AHM: CV, Pulmonary, Endocrine, GI, Bone Screening, Vitamin supplementation, and Immunizations with PCP  PETRA Russo MD

## 2023-09-11 PROBLEM — Z01.419 WELL WOMAN EXAM WITH ROUTINE GYNECOLOGICAL EXAM: Status: ACTIVE | Noted: 2023-09-11

## 2023-09-21 LAB — SARS-COV-2 N GENE NPH QL NAA+PROBE: DETECTED

## 2023-10-03 ENCOUNTER — RESULT REVIEW (OUTPATIENT)
Age: 33
End: 2023-10-03

## 2023-10-03 ENCOUNTER — NON-APPOINTMENT (OUTPATIENT)
Age: 33
End: 2023-10-03

## 2023-10-03 ENCOUNTER — APPOINTMENT (OUTPATIENT)
Dept: GASTROENTEROLOGY | Facility: AMBULATORY MEDICAL SERVICES | Age: 33
End: 2023-10-03
Payer: COMMERCIAL

## 2023-10-03 PROCEDURE — 43239 EGD BIOPSY SINGLE/MULTIPLE: CPT | Mod: GC

## 2023-10-06 RX ORDER — OMEPRAZOLE 40 MG/1
40 CAPSULE, DELAYED RELEASE ORAL
Qty: 90 | Refills: 3 | Status: ACTIVE | COMMUNITY
Start: 2023-01-19

## 2023-10-07 ENCOUNTER — APPOINTMENT (OUTPATIENT)
Dept: FAMILY MEDICINE | Facility: CLINIC | Age: 33
End: 2023-10-07
Payer: COMMERCIAL

## 2023-10-07 PROCEDURE — 99213 OFFICE O/P EST LOW 20 MIN: CPT

## 2023-10-07 RX ORDER — MECLIZINE HYDROCHLORIDE 12.5 MG/1
12.5 TABLET ORAL
Qty: 50 | Refills: 0 | Status: ACTIVE | COMMUNITY
Start: 2023-10-07 | End: 1900-01-01

## 2023-10-13 ENCOUNTER — NON-APPOINTMENT (OUTPATIENT)
Age: 33
End: 2023-10-13

## 2023-10-20 LAB
C TRACH RRNA SPEC QL NAA+PROBE: NOT DETECTED
CYTOLOGY CVX/VAG DOC THIN PREP: ABNORMAL
HPV HIGH+LOW RISK DNA PNL CVX: DETECTED
N GONORRHOEA RRNA SPEC QL NAA+PROBE: NOT DETECTED
SOURCE TP AMPLIFICATION: NORMAL
T VAGINALIS RRNA SPEC QL NAA+PROBE: NOT DETECTED

## 2023-10-23 ENCOUNTER — EMERGENCY (EMERGENCY)
Facility: HOSPITAL | Age: 33
LOS: 0 days | Discharge: HOME | End: 2023-10-24
Attending: EMERGENCY MEDICINE
Payer: COMMERCIAL

## 2023-10-23 VITALS — WEIGHT: 147.93 LBS | HEIGHT: 63 IN

## 2023-10-23 DIAGNOSIS — R10.9 UNSPECIFIED ABDOMINAL PAIN: ICD-10-CM

## 2023-10-23 DIAGNOSIS — R42 DIZZINESS AND GIDDINESS: ICD-10-CM

## 2023-10-23 DIAGNOSIS — K29.70 GASTRITIS, UNSPECIFIED, WITHOUT BLEEDING: ICD-10-CM

## 2023-10-23 DIAGNOSIS — Z87.19 PERSONAL HISTORY OF OTHER DISEASES OF THE DIGESTIVE SYSTEM: ICD-10-CM

## 2023-10-23 DIAGNOSIS — Z90.49 ACQUIRED ABSENCE OF OTHER SPECIFIED PARTS OF DIGESTIVE TRACT: Chronic | ICD-10-CM

## 2023-10-23 DIAGNOSIS — Z90.49 ACQUIRED ABSENCE OF OTHER SPECIFIED PARTS OF DIGESTIVE TRACT: ICD-10-CM

## 2023-10-23 LAB
ALBUMIN SERPL ELPH-MCNC: 4 G/DL — SIGNIFICANT CHANGE UP (ref 3.3–5)
ALBUMIN SERPL ELPH-MCNC: 4 G/DL — SIGNIFICANT CHANGE UP (ref 3.3–5)
ALP SERPL-CCNC: 80 U/L — SIGNIFICANT CHANGE UP (ref 40–120)
ALP SERPL-CCNC: 80 U/L — SIGNIFICANT CHANGE UP (ref 40–120)
ALT FLD-CCNC: 18 U/L — SIGNIFICANT CHANGE UP (ref 12–78)
ALT FLD-CCNC: 18 U/L — SIGNIFICANT CHANGE UP (ref 12–78)
ANION GAP SERPL CALC-SCNC: 4 MMOL/L — LOW (ref 5–17)
ANION GAP SERPL CALC-SCNC: 4 MMOL/L — LOW (ref 5–17)
APPEARANCE UR: CLEAR — SIGNIFICANT CHANGE UP
APPEARANCE UR: CLEAR — SIGNIFICANT CHANGE UP
AST SERPL-CCNC: 14 U/L — LOW (ref 15–37)
AST SERPL-CCNC: 14 U/L — LOW (ref 15–37)
BACTERIA # UR AUTO: NEGATIVE /HPF — SIGNIFICANT CHANGE UP
BACTERIA # UR AUTO: NEGATIVE /HPF — SIGNIFICANT CHANGE UP
BASOPHILS # BLD AUTO: 0.04 K/UL — SIGNIFICANT CHANGE UP (ref 0–0.2)
BASOPHILS # BLD AUTO: 0.04 K/UL — SIGNIFICANT CHANGE UP (ref 0–0.2)
BASOPHILS NFR BLD AUTO: 0.6 % — SIGNIFICANT CHANGE UP (ref 0–2)
BASOPHILS NFR BLD AUTO: 0.6 % — SIGNIFICANT CHANGE UP (ref 0–2)
BILIRUB SERPL-MCNC: 0.6 MG/DL — SIGNIFICANT CHANGE UP (ref 0.2–1.2)
BILIRUB SERPL-MCNC: 0.6 MG/DL — SIGNIFICANT CHANGE UP (ref 0.2–1.2)
BILIRUB UR-MCNC: NEGATIVE — SIGNIFICANT CHANGE UP
BILIRUB UR-MCNC: NEGATIVE — SIGNIFICANT CHANGE UP
BUN SERPL-MCNC: 11 MG/DL — SIGNIFICANT CHANGE UP (ref 7–23)
BUN SERPL-MCNC: 11 MG/DL — SIGNIFICANT CHANGE UP (ref 7–23)
CALCIUM SERPL-MCNC: 8.9 MG/DL — SIGNIFICANT CHANGE UP (ref 8.5–10.1)
CALCIUM SERPL-MCNC: 8.9 MG/DL — SIGNIFICANT CHANGE UP (ref 8.5–10.1)
CAST: 0 /LPF — SIGNIFICANT CHANGE UP (ref 0–4)
CAST: 0 /LPF — SIGNIFICANT CHANGE UP (ref 0–4)
CHLORIDE SERPL-SCNC: 109 MMOL/L — HIGH (ref 96–108)
CHLORIDE SERPL-SCNC: 109 MMOL/L — HIGH (ref 96–108)
CO2 SERPL-SCNC: 28 MMOL/L — SIGNIFICANT CHANGE UP (ref 22–31)
CO2 SERPL-SCNC: 28 MMOL/L — SIGNIFICANT CHANGE UP (ref 22–31)
COLOR SPEC: YELLOW — SIGNIFICANT CHANGE UP
COLOR SPEC: YELLOW — SIGNIFICANT CHANGE UP
CREAT SERPL-MCNC: 0.71 MG/DL — SIGNIFICANT CHANGE UP (ref 0.5–1.3)
CREAT SERPL-MCNC: 0.71 MG/DL — SIGNIFICANT CHANGE UP (ref 0.5–1.3)
DIFF PNL FLD: ABNORMAL
DIFF PNL FLD: ABNORMAL
EGFR: 116 ML/MIN/1.73M2 — SIGNIFICANT CHANGE UP
EGFR: 116 ML/MIN/1.73M2 — SIGNIFICANT CHANGE UP
EOSINOPHIL # BLD AUTO: 0.06 K/UL — SIGNIFICANT CHANGE UP (ref 0–0.5)
EOSINOPHIL # BLD AUTO: 0.06 K/UL — SIGNIFICANT CHANGE UP (ref 0–0.5)
EOSINOPHIL NFR BLD AUTO: 0.9 % — SIGNIFICANT CHANGE UP (ref 0–6)
EOSINOPHIL NFR BLD AUTO: 0.9 % — SIGNIFICANT CHANGE UP (ref 0–6)
GLUCOSE SERPL-MCNC: 82 MG/DL — SIGNIFICANT CHANGE UP (ref 70–99)
GLUCOSE SERPL-MCNC: 82 MG/DL — SIGNIFICANT CHANGE UP (ref 70–99)
GLUCOSE UR QL: NEGATIVE MG/DL — SIGNIFICANT CHANGE UP
GLUCOSE UR QL: NEGATIVE MG/DL — SIGNIFICANT CHANGE UP
HCT VFR BLD CALC: 38 % — SIGNIFICANT CHANGE UP (ref 34.5–45)
HCT VFR BLD CALC: 38 % — SIGNIFICANT CHANGE UP (ref 34.5–45)
HGB BLD-MCNC: 12.6 G/DL — SIGNIFICANT CHANGE UP (ref 11.5–15.5)
HGB BLD-MCNC: 12.6 G/DL — SIGNIFICANT CHANGE UP (ref 11.5–15.5)
IMM GRANULOCYTES NFR BLD AUTO: 0.3 % — SIGNIFICANT CHANGE UP (ref 0–0.9)
IMM GRANULOCYTES NFR BLD AUTO: 0.3 % — SIGNIFICANT CHANGE UP (ref 0–0.9)
KETONES UR-MCNC: NEGATIVE MG/DL — SIGNIFICANT CHANGE UP
KETONES UR-MCNC: NEGATIVE MG/DL — SIGNIFICANT CHANGE UP
LEUKOCYTE ESTERASE UR-ACNC: NEGATIVE — SIGNIFICANT CHANGE UP
LEUKOCYTE ESTERASE UR-ACNC: NEGATIVE — SIGNIFICANT CHANGE UP
LYMPHOCYTES # BLD AUTO: 2.26 K/UL — SIGNIFICANT CHANGE UP (ref 1–3.3)
LYMPHOCYTES # BLD AUTO: 2.26 K/UL — SIGNIFICANT CHANGE UP (ref 1–3.3)
LYMPHOCYTES # BLD AUTO: 32.8 % — SIGNIFICANT CHANGE UP (ref 13–44)
LYMPHOCYTES # BLD AUTO: 32.8 % — SIGNIFICANT CHANGE UP (ref 13–44)
MCHC RBC-ENTMCNC: 30.5 PG — SIGNIFICANT CHANGE UP (ref 27–34)
MCHC RBC-ENTMCNC: 30.5 PG — SIGNIFICANT CHANGE UP (ref 27–34)
MCHC RBC-ENTMCNC: 33.2 GM/DL — SIGNIFICANT CHANGE UP (ref 32–36)
MCHC RBC-ENTMCNC: 33.2 GM/DL — SIGNIFICANT CHANGE UP (ref 32–36)
MCV RBC AUTO: 92 FL — SIGNIFICANT CHANGE UP (ref 80–100)
MCV RBC AUTO: 92 FL — SIGNIFICANT CHANGE UP (ref 80–100)
MONOCYTES # BLD AUTO: 0.57 K/UL — SIGNIFICANT CHANGE UP (ref 0–0.9)
MONOCYTES # BLD AUTO: 0.57 K/UL — SIGNIFICANT CHANGE UP (ref 0–0.9)
MONOCYTES NFR BLD AUTO: 8.3 % — SIGNIFICANT CHANGE UP (ref 2–14)
MONOCYTES NFR BLD AUTO: 8.3 % — SIGNIFICANT CHANGE UP (ref 2–14)
NEUTROPHILS # BLD AUTO: 3.95 K/UL — SIGNIFICANT CHANGE UP (ref 1.8–7.4)
NEUTROPHILS # BLD AUTO: 3.95 K/UL — SIGNIFICANT CHANGE UP (ref 1.8–7.4)
NEUTROPHILS NFR BLD AUTO: 57.1 % — SIGNIFICANT CHANGE UP (ref 43–77)
NEUTROPHILS NFR BLD AUTO: 57.1 % — SIGNIFICANT CHANGE UP (ref 43–77)
NITRITE UR-MCNC: NEGATIVE — SIGNIFICANT CHANGE UP
NITRITE UR-MCNC: NEGATIVE — SIGNIFICANT CHANGE UP
PH UR: 7 — SIGNIFICANT CHANGE UP (ref 5–8)
PH UR: 7 — SIGNIFICANT CHANGE UP (ref 5–8)
PLATELET # BLD AUTO: 234 K/UL — SIGNIFICANT CHANGE UP (ref 150–400)
PLATELET # BLD AUTO: 234 K/UL — SIGNIFICANT CHANGE UP (ref 150–400)
POTASSIUM SERPL-MCNC: 3.8 MMOL/L — SIGNIFICANT CHANGE UP (ref 3.5–5.3)
POTASSIUM SERPL-MCNC: 3.8 MMOL/L — SIGNIFICANT CHANGE UP (ref 3.5–5.3)
POTASSIUM SERPL-SCNC: 3.8 MMOL/L — SIGNIFICANT CHANGE UP (ref 3.5–5.3)
POTASSIUM SERPL-SCNC: 3.8 MMOL/L — SIGNIFICANT CHANGE UP (ref 3.5–5.3)
PROT SERPL-MCNC: 7.8 GM/DL — SIGNIFICANT CHANGE UP (ref 6–8.3)
PROT SERPL-MCNC: 7.8 GM/DL — SIGNIFICANT CHANGE UP (ref 6–8.3)
PROT UR-MCNC: NEGATIVE MG/DL — SIGNIFICANT CHANGE UP
PROT UR-MCNC: NEGATIVE MG/DL — SIGNIFICANT CHANGE UP
RBC # BLD: 4.13 M/UL — SIGNIFICANT CHANGE UP (ref 3.8–5.2)
RBC # BLD: 4.13 M/UL — SIGNIFICANT CHANGE UP (ref 3.8–5.2)
RBC # FLD: 13.3 % — SIGNIFICANT CHANGE UP (ref 10.3–14.5)
RBC # FLD: 13.3 % — SIGNIFICANT CHANGE UP (ref 10.3–14.5)
RBC CASTS # UR COMP ASSIST: 5 /HPF — HIGH (ref 0–4)
RBC CASTS # UR COMP ASSIST: 5 /HPF — HIGH (ref 0–4)
SODIUM SERPL-SCNC: 141 MMOL/L — SIGNIFICANT CHANGE UP (ref 135–145)
SODIUM SERPL-SCNC: 141 MMOL/L — SIGNIFICANT CHANGE UP (ref 135–145)
SP GR SPEC: 1.01 — SIGNIFICANT CHANGE UP (ref 1–1.03)
SP GR SPEC: 1.01 — SIGNIFICANT CHANGE UP (ref 1–1.03)
SQUAMOUS # UR AUTO: 0 /HPF — SIGNIFICANT CHANGE UP (ref 0–5)
SQUAMOUS # UR AUTO: 0 /HPF — SIGNIFICANT CHANGE UP (ref 0–5)
UROBILINOGEN FLD QL: 1 MG/DL — SIGNIFICANT CHANGE UP (ref 0.2–1)
UROBILINOGEN FLD QL: 1 MG/DL — SIGNIFICANT CHANGE UP (ref 0.2–1)
WBC # BLD: 6.9 K/UL — SIGNIFICANT CHANGE UP (ref 3.8–10.5)
WBC # BLD: 6.9 K/UL — SIGNIFICANT CHANGE UP (ref 3.8–10.5)
WBC # FLD AUTO: 6.9 K/UL — SIGNIFICANT CHANGE UP (ref 3.8–10.5)
WBC # FLD AUTO: 6.9 K/UL — SIGNIFICANT CHANGE UP (ref 3.8–10.5)
WBC UR QL: 0 /HPF — SIGNIFICANT CHANGE UP (ref 0–5)
WBC UR QL: 0 /HPF — SIGNIFICANT CHANGE UP (ref 0–5)

## 2023-10-23 PROCEDURE — 80053 COMPREHEN METABOLIC PANEL: CPT

## 2023-10-23 PROCEDURE — 93010 ELECTROCARDIOGRAM REPORT: CPT

## 2023-10-23 PROCEDURE — 99284 EMERGENCY DEPT VISIT MOD MDM: CPT | Mod: 25

## 2023-10-23 PROCEDURE — 87086 URINE CULTURE/COLONY COUNT: CPT

## 2023-10-23 PROCEDURE — 85025 COMPLETE CBC W/AUTO DIFF WBC: CPT

## 2023-10-23 PROCEDURE — 93005 ELECTROCARDIOGRAM TRACING: CPT

## 2023-10-23 PROCEDURE — 96374 THER/PROPH/DIAG INJ IV PUSH: CPT

## 2023-10-23 PROCEDURE — 99284 EMERGENCY DEPT VISIT MOD MDM: CPT

## 2023-10-23 PROCEDURE — 81001 URINALYSIS AUTO W/SCOPE: CPT

## 2023-10-23 PROCEDURE — 36415 COLL VENOUS BLD VENIPUNCTURE: CPT

## 2023-10-23 RX ORDER — SODIUM CHLORIDE 9 MG/ML
1000 INJECTION INTRAMUSCULAR; INTRAVENOUS; SUBCUTANEOUS ONCE
Refills: 0 | Status: COMPLETED | OUTPATIENT
Start: 2023-10-23 | End: 2023-10-23

## 2023-10-23 RX ORDER — METOCLOPRAMIDE HCL 10 MG
10 TABLET ORAL ONCE
Refills: 0 | Status: COMPLETED | OUTPATIENT
Start: 2023-10-23 | End: 2023-10-23

## 2023-10-23 RX ORDER — MECLIZINE HCL 12.5 MG
50 TABLET ORAL ONCE
Refills: 0 | Status: COMPLETED | OUTPATIENT
Start: 2023-10-23 | End: 2023-10-23

## 2023-10-23 RX ADMIN — SODIUM CHLORIDE 2000 MILLILITER(S): 9 INJECTION INTRAMUSCULAR; INTRAVENOUS; SUBCUTANEOUS at 21:53

## 2023-10-23 RX ADMIN — Medication 50 MILLIGRAM(S): at 21:46

## 2023-10-23 RX ADMIN — Medication 10 MILLIGRAM(S): at 21:46

## 2023-10-23 NOTE — ED STATDOCS - NSFOLLOWUPINSTRUCTIONS_ED_ALL_ED_FT
Please take meclizine as prescribed.   Vertigo  Vertigo is the feeling that you or your surroundings are moving when they are not. This feeling can come and go at any time. Vertigo often goes away on its own. Vertigo can be dangerous if it occurs while you are doing something that could endanger yourself or others, such as driving or operating machinery.    Your health care provider will do tests to try to determine the cause of your vertigo. Tests will also help your health care provider decide how best to treat your condition.    Follow these instructions at home:  Eating and drinking    A comparison of three sample cups showing dark yellow, yellow, and pale yellow urine.  A sign showing that a person should not drink beer, wine, or liquor.  Dehydration can make vertigo worse. Drink enough fluid to keep your urine pale yellow.  Do not drink alcohol.  Activity    Return to your normal activities as told by your health care provider. Ask your health care provider what activities are safe for you.  In the morning, first sit up on the side of the bed. When you feel okay, stand slowly while you hold onto something until you know that your balance is fine.  Move slowly. Avoid sudden body or head movements or certain positions, as told by your health care provider.  If you have trouble walking or keeping your balance, try using a cane for stability. If you feel dizzy or unstable, sit down right away.  Avoid doing any tasks that would cause danger to you or others if vertigo occurs.  Avoid bending down if you feel dizzy. Place items in your home so that they are easy for you to reach without bending or leaning over.  Do not drive or use machinery if you feel dizzy.  General instructions    Take over-the-counter and prescription medicines only as told by your health care provider.  Keep all follow-up visits. This is important.  Contact a health care provider if:  Your medicines do not relieve your vertigo or they make it worse.  Your condition gets worse or you develop new symptoms.  You have a fever.  You develop nausea or vomiting, or if nausea gets worse.  Your family or friends notice any behavioral changes.  You have numbness or a prickling and tingling sensation in part of your body.  Get help right away if you:  Are always dizzy or you faint.  Develop severe headaches.  Develop a stiff neck.  Develop sensitivity to light.  Have difficulty moving or speaking.  Have weakness in your hands, arms, or legs.  Have changes in your hearing or vision.  These symptoms may represent a serious problem that is an emergency. Do not wait to see if the symptoms will go away. Get medical help right away. Call your local emergency services (911 in the [place] Do not drive yourself to the hospital.    Summary  Vertigo is the feeling that you or your surroundings are moving when they are not.  Your health care provider will do tests to try to determine the cause of your vertigo.  Follow instructions for home care. You may be told to avoid certain tasks, positions, or movements.  Contact a health care provider if your medicines do not relieve your symptoms, or if you have a fever, nausea, vomiting, or changes in behavior.  Get help right away if you have severe headaches or difficulty speaking, or you develop hearing or vision problems.  This information is not intended to replace advice given to you by your health care provider. Make sure you discuss any questions you have with your health care provider.    Document Revised: 11/17/2021 Document Reviewed: 11/17/2021  Elsevier Patient Education © 2023 Elsevier Inc.

## 2023-10-23 NOTE — ED ADULT TRIAGE NOTE - HEIGHT IN INCHES
3 Patient declined      11/27/19 1100   Activity/Group Checklist   Group   (IMR/ Entitlement vs Thanksgiving )   Attendance Did not attend   Attendance Duration (min) 46-60   Affect/Mood IRMA

## 2023-10-23 NOTE — ED STATDOCS - CLINICAL SUMMARY MEDICAL DECISION MAKING FREE TEXT BOX
Patient with known history of vertigo presenting with room spinning sensation with nystagmus on exam, likely vertigo, patient also with some GI discomfort in the setting of taking Bactrim for UTI will give antiemetics meclizine and reevaluate.

## 2023-10-23 NOTE — ED STATDOCS - OBJECTIVE STATEMENT
32 y/o female with PMHx of ectopic pregnancy, cholecystectomy, gastritis on omeprazole presents to the ED c/o dizziness. Dizziness does not worsen when moving head. Had similar episode of lightheadedness recently, saw MD, prescribed meclozine which she did not take. Endorses abdominal pain. Denies SOB, N/V/D, dark stool. Recently went to  for urinary symptoms, diagnosed with UTI, prescribed bactrim which she is currently still taking.

## 2023-10-23 NOTE — ED STATDOCS - PHYSICAL EXAMINATION
Gen: Well appearing in NAD  Head: NC/AT  Neck: Trachea midline  Resp: No distress  Ext: No deformities  Neuro: L beating nystagmus  Skin: Warm and dry as visualized  Psych: Normal affect and mood

## 2023-10-23 NOTE — ED ADULT TRIAGE NOTE - CHIEF COMPLAINT QUOTE
pt c/o dizziness, palpitations, nausea, abdominal pain. denies sob/n/v/d/fever/chills. no significant pmh. ekg in tirage.

## 2023-10-23 NOTE — ED STATDOCS - PATIENT PORTAL LINK FT
You can access the FollowMyHealth Patient Portal offered by [facility] by registering at the following website: [email or web address]. By joining [employer]’s FollowMyHealth portal, you will also be able to view your health information using other applications (apps) compatible with our system.

## 2023-10-24 VITALS
RESPIRATION RATE: 18 BRPM | SYSTOLIC BLOOD PRESSURE: 103 MMHG | OXYGEN SATURATION: 100 % | HEART RATE: 56 BPM | TEMPERATURE: 98 F | DIASTOLIC BLOOD PRESSURE: 58 MMHG

## 2023-10-24 RX ORDER — TAMSULOSIN HYDROCHLORIDE 0.4 MG/1
1 CAPSULE ORAL
Qty: 7 | Refills: 0
Start: 2023-10-24 | End: 2023-10-30

## 2023-10-24 RX ORDER — CEPHALEXIN 500 MG
1 CAPSULE ORAL
Qty: 20 | Refills: 0
Start: 2023-10-24 | End: 2023-10-28

## 2023-10-24 NOTE — ED ADULT NURSE NOTE - CAS DISCH TRANSFER METHOD
My Asthma Action Plan  Name: Manoj Castellanos   YOB: 1996  Date: 3/6/2018   My doctor: Adriana Fraser MD   My clinic: [facility]        My Control Medicine: { :104436}  My Rescue Medicine: { :497267}  {AAP include Oral Steroid:299449} My Asthma Severity: { :502786}  Avoid your asthma triggers: { :342659}        {Is patient a child or adult?:999970}       GREEN ZONE   Good Control    I feel good    No cough or wheeze    Can work, sleep and play without asthma symptoms       Take your asthma control medicine every day.     1. If exercise triggers your asthma, take your rescue medication    15 minutes before exercise or sports, and    During exercise if you have asthma symptoms  2. Spacer to use with inhaler: If you have a spacer, make sure to use it with your inhaler             YELLOW ZONE Getting Worse  I have ANY of these:    I do not feel good    Cough or wheeze    Chest feels tight    Wake up at night   1. Keep taking your Green Zone medications  2. Start taking your rescue medicine:    every 20 minutes for up to 1 hour. Then every 4 hours for 24-48 hours.  3. If you stay in the Yellow Zone for more than 12-24 hours, contact your doctor.  4. If you do not return to the Green Zone in 12-24 hours or you get worse, start taking your oral steroid medicine if prescribed by your provider.           RED ZONE Medical Alert - Get Help  I have ANY of these:    I feel awful    Medicine is not helping    Breathing getting harder    Trouble walking or talking    Nose opens wide to breathe       1. Take your rescue medicine NOW  2. If your provider has prescribed an oral steroid medicine, start taking it NOW  3. Call your doctor NOW  4. If you are still in the Red Zone after 20 minutes and you have not reached your doctor:    Take your rescue medicine again and    Call 911 or go to the emergency room right away    See your regular doctor within 2 weeks of an Emergency Room or Urgent Care visit for follow-up  treatment.        Electronically signed by: Adriana Fraser, March 6, 2018    Annual Reminders:  Meet with Asthma Educator,  Flu Shot in the Fall, consider Pneumonia Vaccination for patients with asthma (aged 19 and older).    Pharmacy: [facility] [address] [place] [address] AT [facility]                    Asthma Triggers  How To Control Things That Make Your Asthma Worse    Triggers are things that make your asthma worse.  Look at the list below to help you find your triggers and what you can do about them.  You can help prevent asthma flare-ups by staying away from your triggers.      Trigger                                                          What you can do   Cigarette Smoke  Tobacco smoke can make asthma worse. Do not allow smoking in your home, car or around you.  Be sure no one smokes at a child s day care or school.  If you smoke, ask your health care provider for ways to help you quit.  Ask family members to quit too.  Ask your health care provider for a referral to Quit Plan to help you quit smoking, or call 1-003-570-PLAN.     Colds, Flu, Bronchitis  These are common triggers of asthma. Wash your hands often.  Don t touch your eyes, nose or mouth.  Get a flu shot every year.     Dust Mites  These are tiny bugs that live in cloth or carpet. They are too small to see. Wash sheets and blankets in hot water every week.   Encase pillows and mattress in dust mite proof covers.  Avoid having carpet if you can. If you have carpet, vacuum weekly.   Use a dust mask and HEPA vacuum.   Pollen and Outdoor Mold  Some people are allergic to trees, grass, or weed pollen, or molds. Try to keep your windows closed.  Limit time out doors when pollen count is high.   Ask you health care provider about taking medicine during allergy season.     Animal Dander  Some people are allergic to skin flakes, urine or saliva from pets with fur or feathers. Keep pets with fur or feathers  out of your home.    If you can t keep the pet outdoors, then keep the pet out of your bedroom.  Keep the bedroom door closed.  Keep pets off cloth furniture and away from stuffed toys.     Mice, Rats, and Cockroaches  Some people are allergic to the waste from these pests.   Cover food and garbage.  Clean up spills and food crumbs.  Store grease in the refrigerator.   Keep food out of the bedroom.   Indoor Mold  This can be a trigger if your home has high moisture. Fix leaking faucets, pipes, or other sources of water.   Clean moldy surfaces.  Dehumidify basement if it is damp and smelly.   Smoke, Strong Odors, and Sprays  These can reduce air quality. Stay away from strong odors and sprays, such as perfume, powder, hair spray, paints, smoke incense, paint, cleaning products, candles and new carpet.   Exercise or Sports  Some people with asthma have this trigger. Be active!  Ask your doctor about taking medicine before sports or exercise to prevent symptoms.    Warm up for 5-10 minutes before and after sports or exercise.     Other Triggers of Asthma  Cold air:  Cover your nose and mouth with a scarf.  Sometimes laughing or crying can be a trigger.  Some medicines and food can trigger asthma.      Private car

## 2023-10-25 LAB
CULTURE RESULTS: SIGNIFICANT CHANGE UP
CULTURE RESULTS: SIGNIFICANT CHANGE UP
SPECIMEN SOURCE: SIGNIFICANT CHANGE UP
SPECIMEN SOURCE: SIGNIFICANT CHANGE UP

## 2023-10-26 ENCOUNTER — APPOINTMENT (OUTPATIENT)
Dept: OTOLARYNGOLOGY | Facility: CLINIC | Age: 33
End: 2023-10-26
Payer: COMMERCIAL

## 2023-10-26 VITALS — HEIGHT: 63 IN | BODY MASS INDEX: 26.22 KG/M2 | WEIGHT: 148 LBS

## 2023-10-26 DIAGNOSIS — H69.93 UNSPECIFIED EUSTACHIAN TUBE DISORDER, BILATERAL: ICD-10-CM

## 2023-10-26 DIAGNOSIS — H90.3 SENSORINEURAL HEARING LOSS, BILATERAL: ICD-10-CM

## 2023-10-26 DIAGNOSIS — Z91.09 OTHER ALLERGY STATUS, OTHER THAN TO DRUGS AND BIOLOGICAL SUBSTANCES: ICD-10-CM

## 2023-10-26 DIAGNOSIS — H81.13 BENIGN PAROXYSMAL VERTIGO, BILATERAL: ICD-10-CM

## 2023-10-26 DIAGNOSIS — H83.09 LABYRINTHITIS, UNSPECIFIED EAR: ICD-10-CM

## 2023-10-26 PROCEDURE — 92567 TYMPANOMETRY: CPT

## 2023-10-26 PROCEDURE — 99204 OFFICE O/P NEW MOD 45 MIN: CPT | Mod: 25

## 2023-10-26 PROCEDURE — 92557 COMPREHENSIVE HEARING TEST: CPT

## 2023-10-26 RX ORDER — LEVOCETIRIZINE DIHYDROCHLORIDE 5 MG/1
5 TABLET ORAL DAILY
Qty: 30 | Refills: 4 | Status: ACTIVE | COMMUNITY
Start: 2023-10-26 | End: 1900-01-01

## 2023-10-26 RX ORDER — FLUTICASONE PROPIONATE 50 UG/1
50 SPRAY, METERED NASAL DAILY
Qty: 1 | Refills: 5 | Status: ACTIVE | COMMUNITY
Start: 2023-10-26 | End: 1900-01-01

## 2023-11-04 NOTE — PATIENT PROFILE OB - ATTEMPT TO OOB
Encouraged efforts at weight reduction.
Improved and stable.   Continues with Temovate as needed
No recurrence after transitioning to valsartan HCT and blood pressure nicely controlled
Pressure is nicely controlled on valsartan HCT.
no

## 2023-11-06 RX ORDER — FLUCONAZOLE 150 MG/1
150 TABLET ORAL
Qty: 1 | Refills: 0 | Status: ACTIVE | COMMUNITY
Start: 2023-11-06 | End: 1900-01-01

## 2023-11-20 ENCOUNTER — APPOINTMENT (OUTPATIENT)
Dept: VASCULAR SURGERY | Facility: CLINIC | Age: 33
End: 2023-11-20
Payer: COMMERCIAL

## 2023-11-20 VITALS
HEART RATE: 65 BPM | BODY MASS INDEX: 26.05 KG/M2 | SYSTOLIC BLOOD PRESSURE: 109 MMHG | WEIGHT: 147 LBS | HEIGHT: 63 IN | OXYGEN SATURATION: 99 % | DIASTOLIC BLOOD PRESSURE: 76 MMHG

## 2023-11-20 DIAGNOSIS — I83.10 VARICOSE VEINS OF UNSPECIFIED LOWER EXTREMITY WITH INFLAMMATION: ICD-10-CM

## 2023-11-20 PROCEDURE — 99212 OFFICE O/P EST SF 10 MIN: CPT

## 2023-11-20 PROCEDURE — 93971 EXTREMITY STUDY: CPT

## 2023-12-05 ENCOUNTER — APPOINTMENT (OUTPATIENT)
Dept: GASTROENTEROLOGY | Facility: CLINIC | Age: 33
End: 2023-12-05
Payer: COMMERCIAL

## 2023-12-05 VITALS
SYSTOLIC BLOOD PRESSURE: 104 MMHG | DIASTOLIC BLOOD PRESSURE: 64 MMHG | BODY MASS INDEX: 26.05 KG/M2 | HEIGHT: 63 IN | WEIGHT: 147 LBS

## 2023-12-05 DIAGNOSIS — Z09 ENCOUNTER FOR FOLLOW-UP EXAMINATION AFTER COMPLETED TREATMENT FOR CONDITIONS OTHER THAN MALIGNANT NEOPLASM: ICD-10-CM

## 2023-12-05 DIAGNOSIS — R10.84 GENERALIZED ABDOMINAL PAIN: ICD-10-CM

## 2023-12-05 DIAGNOSIS — K59.09 OTHER CONSTIPATION: ICD-10-CM

## 2023-12-05 PROCEDURE — 99214 OFFICE O/P EST MOD 30 MIN: CPT

## 2023-12-25 PROBLEM — I83.10 VARICOSE VEINS WITH INFLAMMATION: Status: ACTIVE | Noted: 2023-12-25

## 2023-12-25 NOTE — HISTORY OF PRESENT ILLNESS
[FreeTextEntry1] : 31 year old female presents for evaluation of bilateral lower extremity pain. Symptoms have worsened over the last year. She complains of dull aching with standing or sitting, heaviness in her legs and itchiness of the veins around her ankle. She also has ankle swelling that worsens through the day. Symptoms are worse in her right leg. She regularly wears compression stockings without improvement in symptoms. No history of DVT. She denies claudication, rest pain or leg ulcers   12/2022: Patient became pregnant since her last visit and could not proceed with RFA. She returns for pain in medial RLE despite use of compression stockings and leg elevation. She works while standing for over 8 hours daily. She delivered 5 months ago.    [de-identified] : Ms. SHANNEN DO is a 33 year F  who presents for follow-up evaluation of  right leg pain for the past __>12_ months.  Ms. DO leg discomfort is associated with leg swelling, fatigue, heaviness, achiness, restlessness, and muscle cramping. Her symptoms have persisted despite conservative management with leg elevation, exercise,  over-the-counter medications (ibuprofen), and compression stocking use for more than 3 months.  Her symptoms are aggravated by ___ weight bearing, prolonged standing, prolonged sitting.Nothing helps to alleviate patient's symptoms.   Her symptoms interfere with the her ADLs such as work, shopping and housekeeping.     Ms. DO denies history of DVT/SVT/PE or other thromboembolic disease. She denies history of lower extremity claudication, rest pain, or tissue loss. She denies CAD, MI, DM, CRI, CVA, or TIA.

## 2023-12-25 NOTE — ASSESSMENT
[FreeTextEntry1] : Ms. SHANNEN DO is a 33 year with persistent ____bilateral lower extremity venous insufficiency, CEAP classification C-2 which is ___ unresponsive to at least 3 months of compression stockings 20-30 mmHg, leg elevation, exercise and over the counter pain medication_(Ibuprofen).  Patient has complaints of worsening _ leg discomfort with swelling, fatigue, heaviness, achiness, cramping,  and painful varicosities.  The patients symptoms interfere with their ADLs, such as their ability to work and exercise__. Patient has intact pulses in both legs without evidence of arterial insufficiency.     Patient is a candidate for endovenous ablation treatment of the only left GSV and it's small caliber possibly only option is varithena The risks, benefits and alternatives treatment versus continued conservative management were discussed with the patient.  Patient chooses endovenous ablation __treatments. Treatment plan to be scheduled.

## 2024-01-04 NOTE — PATIENT PROFILE OB - FUNCTIONAL ASSESSMENT - BASIC MOBILITY PT AGE POP HIDDEN
Epidural Block    Date/Time: 1/4/2024 5:22 AM    Performed by: Sorin Watson MD  Authorized by: Sorin Watson MD    Patient Location:  L&D  Indication: Labor Pain    Pre anesthesia checklist Patient Identified, Risks and Benefits Discussed, Monitors and Equipment Checked, Timeout Performed, Pre-op Evaluation Complete and IV Bolus - Crystalloid  Epidural:     Patient Position:  Sitting    Prep:  Providone Iodine    Max Sterile Barrier Technique:  Sterile gloves, Cap/Mask, Sterile drape, Sterile dressing applied and Hand Washing    Monitoring:  Continuous pulse oximetry, FHT's and non-invasive blood pressure    Approach:  Midline    Location:  L3-4  Injection Technique:  CON saline  Ultrasound Used:  No  Needle and Epidural Catheter:     Needle Type:  Tuohy    Needle Gauge:  18 G    Needle Length:  3.5 in    CON Depth:  5 cm  Catheter Type:  Side hole  Catheter at Skin Depth:  10 cm  Securement:  Tape and Transparent dressing  Test Dose:  Lidocaine 1.5% with epinephrine 1-to-200,000  Test Dose Volume (mL):  3  Test Dose Result:  Negative  Assessment:    Block Outcome: pain improved  Number of Attempts: 1   Procedure Assessment: patient tolerated procedure well with no complications   Sensory Level:  T6  Start Time:  1/4/2024 5:22 AM  Stop Time: 1/4/2024 5:33 AM      
Adult

## 2024-01-08 LAB — SARS-COV-2 N GENE NPH QL NAA+PROBE: DETECTED

## 2024-02-23 ENCOUNTER — APPOINTMENT (OUTPATIENT)
Dept: FAMILY MEDICINE | Facility: CLINIC | Age: 34
End: 2024-02-23
Payer: COMMERCIAL

## 2024-02-23 DIAGNOSIS — N39.0 URINARY TRACT INFECTION, SITE NOT SPECIFIED: ICD-10-CM

## 2024-02-23 PROCEDURE — 99213 OFFICE O/P EST LOW 20 MIN: CPT

## 2024-02-23 PROCEDURE — 81003 URINALYSIS AUTO W/O SCOPE: CPT | Mod: QW

## 2024-02-23 RX ORDER — SULFAMETHOXAZOLE AND TRIMETHOPRIM 800; 160 MG/1; MG/1
800-160 TABLET ORAL TWICE DAILY
Qty: 14 | Refills: 0 | Status: ACTIVE | COMMUNITY
Start: 2024-02-23 | End: 1900-01-01

## 2024-02-23 NOTE — REVIEW OF SYSTEMS
[Abdominal Pain] : abdominal pain [Dysuria] : no dysuria [Hematuria] : no hematuria [Frequency] : frequency

## 2024-02-23 NOTE — PLAN
[FreeTextEntry1] : Advised Push fluids Avoid holding on to urine Tylenol prn Urine for c/s Start empiric antibiotic. Pt has appt with urogynecologist

## 2024-02-23 NOTE — PHYSICAL EXAM
[Normal] : no acute distress, well nourished, well developed and well-appearing [No HSM] : no HSM [Soft] : abdomen soft [No CVA Tenderness] : no CVA  tenderness [Normal Bowel Sounds] : normal bowel sounds [de-identified] : suprapubic tenderness

## 2024-02-25 LAB — BACTERIA UR CULT: NORMAL

## 2024-02-29 ENCOUNTER — APPOINTMENT (OUTPATIENT)
Dept: UROGYNECOLOGY | Facility: CLINIC | Age: 34
End: 2024-02-29
Payer: COMMERCIAL

## 2024-02-29 VITALS
DIASTOLIC BLOOD PRESSURE: 78 MMHG | SYSTOLIC BLOOD PRESSURE: 118 MMHG | BODY MASS INDEX: 25.34 KG/M2 | WEIGHT: 143 LBS | HEIGHT: 63 IN

## 2024-02-29 DIAGNOSIS — R35.0 FREQUENCY OF MICTURITION: ICD-10-CM

## 2024-02-29 DIAGNOSIS — R35.1 NOCTURIA: ICD-10-CM

## 2024-02-29 DIAGNOSIS — R31.9 HEMATURIA, UNSPECIFIED: ICD-10-CM

## 2024-02-29 DIAGNOSIS — R33.9 RETENTION OF URINE, UNSPECIFIED: ICD-10-CM

## 2024-02-29 LAB
BILIRUB UR QL STRIP: NEGATIVE
CLARITY UR: CLEAR
COLLECTION METHOD: NORMAL
GLUCOSE UR-MCNC: NEGATIVE
HCG UR QL: 0.2 EU/DL
HGB UR QL STRIP.AUTO: NORMAL
KETONES UR-MCNC: NEGATIVE
LEUKOCYTE ESTERASE UR QL STRIP: NORMAL
NITRITE UR QL STRIP: NEGATIVE
PH UR STRIP: 6
PROT UR STRIP-MCNC: NEGATIVE
SP GR UR STRIP: 1.01

## 2024-02-29 PROCEDURE — 51701 INSERT BLADDER CATHETER: CPT | Mod: 59

## 2024-02-29 PROCEDURE — 99204 OFFICE O/P NEW MOD 45 MIN: CPT | Mod: 25

## 2024-02-29 PROCEDURE — 81003 URINALYSIS AUTO W/O SCOPE: CPT | Mod: QW

## 2024-02-29 PROCEDURE — 99459 PELVIC EXAMINATION: CPT

## 2024-02-29 RX ORDER — MIRABEGRON 50 MG/1
50 TABLET, FILM COATED, EXTENDED RELEASE ORAL
Qty: 30 | Refills: 2 | Status: ACTIVE | COMMUNITY
Start: 2024-02-29 | End: 1900-01-01

## 2024-02-29 NOTE — ADDENDUM
[FreeTextEntry1] : This note was written by Mayra Huber, acting as the  for Dr. Davila. This note accurately reflects the work and decisions made by Dr. Davila.

## 2024-02-29 NOTE — PHYSICAL EXAM
[Chaperone Present] : A chaperone was present in the examining room during all aspects of the physical examination [No Acute Distress] : in no acute distress [Well developed] : well developed [Well Nourished] : ~L well nourished [Oriented x3] : oriented to person, place, and time [No Edema] : ~T edema was not present [Warm and Dry] : was warm and dry to touch [Labia Majora] : were normal [Labia Minora] : were normal [Normal Appearance] : general appearance was normal [Soft] :  the cervix was soft [Uterine Adnexae] : were not tender and not enlarged [Normal] : no abnormalities [Post Void Residual ____ml] : post void residual was [unfilled] ml [Cough] : no cough [Tenderness] : ~T no ~M abdominal tenderness observed [Distended] : not distended [de-identified] : no pop  [de-identified] : bilateral levator spasm

## 2024-02-29 NOTE — DISCUSSION/SUMMARY
[FreeTextEntry1] : SHANNEN is a 34 year female who presents for PFD. On exam, normal PVR, no POP, bilateral levator spasm. C/o urinary urgency.  Based on her exams and symptoms we discussed pelvic floor dysfunction with significant levator spasm. We discussed management options including relaxation techniques including stress reduction, avoiding pushing and straining, aveeno oatmeal baths 15 minutes twice daily, and management of constipation. We discussed the importance of physical therapy. We discussed risks and benefits of vaginal valium, she is aware she needs to go to a compounding pharmacy.  [] Ua, cx [] US Pelvis [] Myrbetriq 50mg - risks/benefits discussed/rx sent, 5 weeks samples provided in office today [] [facility] referral   f/u 1 month All questions answered.

## 2024-02-29 NOTE — HISTORY OF PRESENT ILLNESS
[Feelings Of Urinary Urgency] : mild [x3+] : nocturia three or more  times a night [Urinary Tract Infection] : mild [Vaginal Wall Prolapse] : no [Rectal Prolapse] : no [Constipation Obstructed Defecation] : no [Pelvic Pain] : no [Vaginal Pain] : mild [] : months ago [FreeTextEntry1] : SHANNEN is a 34 year female who presents for urinary urgency and frequency for the last few months. No positive urine cultures. No RON. No bulge. No gross hematuria. Occasional pelvic pressure. Dyspareunia. Reports strong urinary urgency but only voids a few drops.      Daytime frequency: Yes  Nocturia: 4 episodes per night  Urinary urgency: Yes  Sensation of incomplete bladder emptying: Yes  History of frequent urinary tract infections: No  History of hematuria: Yes  Previous treatment: None  Vaginal symptoms: Pelvic pressure.    OB: [birth year]  GYN: LMP 2024, Abnormal pap (ASC-US) 2023, Positive HPV testing, No estrogen use, H/o ovarian cysts, Kyleena IUD PMH: None  PSH: Cholecystectomy  Meds: Famotidine, Omeprazole  Allx: None

## 2024-02-29 NOTE — OB HISTORY
[Vaginal ___] : [unfilled] vaginal delivery(s) [Definite ___ (Date)] : the last menstrual period was [unfilled] [Last Pap Smear ___] : date of last pap smear was on [unfilled] [Abnormal Pap Smear] : abnormal pap smear [Sexually Active] : sexually active [FreeTextEntry1] : largest baby 7lbs [Taking Estrogens] : is not taking estrogen replacement

## 2024-02-29 NOTE — PROCEDURE
[Straight Catheterization] : insertion of a straight catheter [Urinary Frequency] : urinary frequency [Patient] : the patient [___ Fr Straight Tip] : a [unfilled] in Japanese straight tip catheter [None] : there were no complications with the catheter insertion [Clear] : clear [No Complications] : no complications [Tolerated Well] : the patient tolerated the procedure well [0] : 0 [Post procedure instructions and information given] : Post procedure instructions and information were given and reviewed with patient.

## 2024-03-01 LAB
APPEARANCE: CLEAR
BACTERIA: NEGATIVE /HPF
BILIRUBIN URINE: NEGATIVE
BLOOD URINE: ABNORMAL
CAST: 0 /LPF
COLOR: YELLOW
EPITHELIAL CELLS: 1 /HPF
GLUCOSE QUALITATIVE U: NEGATIVE MG/DL
KETONES URINE: NEGATIVE MG/DL
LEUKOCYTE ESTERASE URINE: ABNORMAL
MICROSCOPIC-UA: NORMAL
NITRITE URINE: NEGATIVE
PH URINE: 6
PROTEIN URINE: NEGATIVE MG/DL
RED BLOOD CELLS URINE: 0 /HPF
SPECIFIC GRAVITY URINE: 1.01
UROBILINOGEN URINE: 0.2 MG/DL
WHITE BLOOD CELLS URINE: 0 /HPF

## 2024-03-25 ENCOUNTER — OUTPATIENT (OUTPATIENT)
Dept: OUTPATIENT SERVICES | Facility: HOSPITAL | Age: 34
LOS: 1 days | End: 2024-03-25
Payer: COMMERCIAL

## 2024-03-25 ENCOUNTER — APPOINTMENT (OUTPATIENT)
Dept: ULTRASOUND IMAGING | Facility: CLINIC | Age: 34
End: 2024-03-25
Payer: COMMERCIAL

## 2024-03-25 DIAGNOSIS — R10.30 LOWER ABDOMINAL PAIN, UNSPECIFIED: ICD-10-CM

## 2024-03-25 DIAGNOSIS — R39.15 URGENCY OF URINATION: ICD-10-CM

## 2024-03-25 DIAGNOSIS — Z90.49 ACQUIRED ABSENCE OF OTHER SPECIFIED PARTS OF DIGESTIVE TRACT: Chronic | ICD-10-CM

## 2024-03-25 PROCEDURE — 76830 TRANSVAGINAL US NON-OB: CPT

## 2024-03-25 PROCEDURE — 76856 US EXAM PELVIC COMPLETE: CPT

## 2024-03-25 PROCEDURE — 76830 TRANSVAGINAL US NON-OB: CPT | Mod: 26

## 2024-03-25 PROCEDURE — 76856 US EXAM PELVIC COMPLETE: CPT | Mod: 26

## 2024-04-08 ENCOUNTER — APPOINTMENT (OUTPATIENT)
Dept: UROGYNECOLOGY | Facility: CLINIC | Age: 34
End: 2024-04-08

## 2024-04-25 ENCOUNTER — TRANSCRIPTION ENCOUNTER (OUTPATIENT)
Age: 34
End: 2024-04-25

## 2024-06-06 ENCOUNTER — APPOINTMENT (OUTPATIENT)
Dept: FAMILY MEDICINE | Facility: CLINIC | Age: 34
End: 2024-06-06
Payer: COMMERCIAL

## 2024-06-06 VITALS
HEIGHT: 63 IN | WEIGHT: 146 LBS | DIASTOLIC BLOOD PRESSURE: 68 MMHG | HEART RATE: 74 BPM | SYSTOLIC BLOOD PRESSURE: 118 MMHG | OXYGEN SATURATION: 98 % | TEMPERATURE: 97.2 F | BODY MASS INDEX: 25.87 KG/M2

## 2024-06-06 DIAGNOSIS — Z00.00 ENCOUNTER FOR GENERAL ADULT MEDICAL EXAMINATION W/OUT ABNORMAL FINDINGS: ICD-10-CM

## 2024-06-06 PROCEDURE — 99395 PREV VISIT EST AGE 18-39: CPT

## 2024-06-06 PROCEDURE — 36415 COLL VENOUS BLD VENIPUNCTURE: CPT

## 2024-06-06 RX ORDER — FAMOTIDINE 20 MG/1
20 TABLET, FILM COATED ORAL
Qty: 180 | Refills: 1 | Status: ACTIVE | COMMUNITY
Start: 1900-01-01 | End: 1900-01-01

## 2024-06-06 NOTE — HEALTH RISK ASSESSMENT
[1 or 2 (0 pts)] : 1 or 2 (0 points) [Never (0 pts)] : Never (0 points) [No] : In the past 12 months have you used drugs other than those required for medical reasons? No [No falls in past year] : Patient reported no falls in the past year [0] : 2) Feeling down, depressed, or hopeless: Not at all (0) [PHQ-2 Negative - No further assessment needed] : PHQ-2 Negative - No further assessment needed [Never] : Never [Patient reported PAP Smear was normal] : Patient reported PAP Smear was normal [HIV test declined] : HIV test declined [Hepatitis C test declined] : Hepatitis C test declined [With Family] : lives with family [# of Members in Household ___] :  household currently consist of [unfilled] member(s) [Employed] : employed [College] : College [] :  [# Of Children ___] : has [unfilled] children [Sexually Active] : sexually active [Feels Safe at Home] : Feels safe at home [Fully functional (bathing, dressing, toileting, transferring, walking, feeding)] : Fully functional (bathing, dressing, toileting, transferring, walking, feeding) [Fully functional (using the telephone, shopping, preparing meals, housekeeping, doing laundry, using] : Fully functional and needs no help or supervision to perform IADLs (using the telephone, shopping, preparing meals, housekeeping, doing laundry, using transportation, managing medications and managing finances) [Reports normal functional visual acuity (ie: able to read med bottle)] : Reports normal functional visual acuity [Smoke Detector] : smoke detector [Carbon Monoxide Detector] : carbon monoxide detector [Seat Belt] :  uses seat belt [Sunscreen] : uses sunscreen [Travel to Developing Areas] : travel to developing areas [Audit-CScore] : 0 [[id number]] : 0 [Change in mental status noted] : No change in mental status noted [Language] : denies difficulty with language [Behavior] : denies difficulty with behavior [Learning/Retaining New Information] : denies difficulty learning/retaining new information [Handling Complex Tasks] : denies difficulty handling complex tasks [Reasoning] : denies difficulty with reasoning [Spatial Ability and Orientation] : denies difficulty with spatial ability and orientation [Reports changes in hearing] : Reports no changes in hearing [Reports changes in vision] : Reports no changes in vision [Reports changes in dental health] : Reports no changes in dental health [Guns at Home] : no guns at home [TB Exposure] : is not being exposed to tuberculosis [PapSmearDate] : 10/23

## 2024-06-06 NOTE — PHYSICAL EXAM
[No Acute Distress] : no acute distress [Well Nourished] : well nourished [Well Developed] : well developed [Well-Appearing] : well-appearing [Normal Sclera/Conjunctiva] : normal sclera/conjunctiva [PERRL] : pupils equal round and reactive to light [EOMI] : extraocular movements intact [Normal Outer Ear/Nose] : the outer ears and nose were normal in appearance [Normal Oropharynx] : the oropharynx was normal [No JVD] : no jugular venous distention [No Lymphadenopathy] : no lymphadenopathy [Supple] : supple [Thyroid Normal, No Nodules] : the thyroid was normal and there were no nodules present [No Respiratory Distress] : no respiratory distress  [No Accessory Muscle Use] : no accessory muscle use [Clear to Auscultation] : lungs were clear to auscultation bilaterally [Normal Rate] : normal rate  [Regular Rhythm] : with a regular rhythm [Normal S1, S2] : normal S1 and S2 [No Murmur] : no murmur heard [No Carotid Bruits] : no carotid bruits [No Abdominal Bruit] : a ~M bruit was not heard ~T in the abdomen [No Varicosities] : no varicosities [Pedal Pulses Present] : the pedal pulses are present [No Edema] : there was no peripheral edema [No Palpable Aorta] : no palpable aorta [No Extremity Clubbing/Cyanosis] : no extremity clubbing/cyanosis [Soft] : abdomen soft [Non Tender] : non-tender [Non-distended] : non-distended [No Masses] : no abdominal mass palpated [No HSM] : no HSM [Normal Bowel Sounds] : normal bowel sounds [Normal Posterior Cervical Nodes] : no posterior cervical lymphadenopathy [Normal Anterior Cervical Nodes] : no anterior cervical lymphadenopathy [No CVA Tenderness] : no CVA  tenderness [No Spinal Tenderness] : no spinal tenderness [No Joint Swelling] : no joint swelling [Grossly Normal Strength/Tone] : grossly normal strength/tone [No Rash] : no rash [Coordination Grossly Intact] : coordination grossly intact [No Focal Deficits] : no focal deficits [Normal Gait] : normal gait [Deep Tendon Reflexes (DTR)] : deep tendon reflexes were 2+ and symmetric [Normal Affect] : the affect was normal [Normal Insight/Judgement] : insight and judgment were intact [FreeTextEntry1] : Right 0.5- 1- 2- 4-   Left 0.5- 1- 2- 4-

## 2024-06-12 LAB
25(OH)D3 SERPL-MCNC: 16.6 NG/ML
ALBUMIN SERPL ELPH-MCNC: 4.6 G/DL
ALP BLD-CCNC: 73 U/L
ALT SERPL-CCNC: 12 U/L
ANION GAP SERPL CALC-SCNC: 12 MMOL/L
APPEARANCE: CLEAR
AST SERPL-CCNC: 16 U/L
BACTERIA: NEGATIVE /HPF
BILIRUB SERPL-MCNC: 0.4 MG/DL
BILIRUBIN URINE: NEGATIVE
BLOOD URINE: ABNORMAL
BUN SERPL-MCNC: 12 MG/DL
CALCIUM SERPL-MCNC: 9.3 MG/DL
CAST: 0 /LPF
CHLORIDE SERPL-SCNC: 102 MMOL/L
CHOLEST SERPL-MCNC: 147 MG/DL
CO2 SERPL-SCNC: 24 MMOL/L
COLOR: YELLOW
CREAT SERPL-MCNC: 0.74 MG/DL
EGFR: 109 ML/MIN/1.73M2
EPITHELIAL CELLS: 2 /HPF
ESTIMATED AVERAGE GLUCOSE: 114 MG/DL
FERRITIN SERPL-MCNC: 36 NG/ML
GLUCOSE QUALITATIVE U: NEGATIVE MG/DL
GLUCOSE SERPL-MCNC: 93 MG/DL
HBA1C MFR BLD HPLC: 5.6 %
HCG SERPL-MCNC: <1 MIU/ML
HCT VFR BLD CALC: 39.8 %
HDLC SERPL-MCNC: 44 MG/DL
HGB BLD-MCNC: 12.3 G/DL
IRON SATN MFR SERPL: 24 %
IRON SERPL-MCNC: 70 UG/DL
KETONES URINE: NEGATIVE MG/DL
LDLC SERPL CALC-MCNC: 86 MG/DL
LEUKOCYTE ESTERASE URINE: ABNORMAL
MCHC RBC-ENTMCNC: 29.7 PG
MCHC RBC-ENTMCNC: 30.9 GM/DL
MCV RBC AUTO: 96.1 FL
MICROSCOPIC-UA: NORMAL
NITRITE URINE: NEGATIVE
NONHDLC SERPL-MCNC: 104 MG/DL
PH URINE: 6
PLATELET # BLD AUTO: 233 K/UL
POTASSIUM SERPL-SCNC: 4.4 MMOL/L
PROT SERPL-MCNC: 7.3 G/DL
PROTEIN URINE: NEGATIVE MG/DL
RBC # BLD: 4.14 M/UL
RBC # FLD: 14 %
RED BLOOD CELLS URINE: 0 /HPF
SODIUM SERPL-SCNC: 138 MMOL/L
SPECIFIC GRAVITY URINE: 1.02
T3FREE SERPL-MCNC: 2.95 PG/ML
T4 FREE SERPL-MCNC: 1.1 NG/DL
TIBC SERPL-MCNC: 294 UG/DL
TRIGL SERPL-MCNC: 97 MG/DL
TSH SERPL-ACNC: 1.1 UIU/ML
UIBC SERPL-MCNC: 224 UG/DL
UROBILINOGEN URINE: 0.2 MG/DL
VIT B12 SERPL-MCNC: 394 PG/ML
WBC # FLD AUTO: 4.71 K/UL
WHITE BLOOD CELLS URINE: 0 /HPF

## 2024-06-12 RX ORDER — ERGOCALCIFEROL 1.25 MG/1
1.25 MG CAPSULE, LIQUID FILLED ORAL
Qty: 12 | Refills: 1 | Status: ACTIVE | COMMUNITY
Start: 2022-11-03 | End: 1900-01-01

## 2024-06-14 ENCOUNTER — NON-APPOINTMENT (OUTPATIENT)
Age: 34
End: 2024-06-14

## 2024-07-22 DIAGNOSIS — Z11.59 ENCOUNTER FOR SCREENING FOR OTHER VIRAL DISEASES: ICD-10-CM

## 2024-07-23 LAB — SARS-COV-2 N GENE NPH QL NAA+PROBE: NOT DETECTED

## 2024-10-17 ENCOUNTER — RX CHANGE (OUTPATIENT)
Age: 34
End: 2024-10-17

## 2024-10-26 ENCOUNTER — APPOINTMENT (OUTPATIENT)
Dept: FAMILY MEDICINE | Facility: CLINIC | Age: 34
End: 2024-10-26

## 2024-10-26 VITALS
HEIGHT: 63 IN | DIASTOLIC BLOOD PRESSURE: 66 MMHG | TEMPERATURE: 98.1 F | OXYGEN SATURATION: 99 % | SYSTOLIC BLOOD PRESSURE: 112 MMHG | HEART RATE: 70 BPM | WEIGHT: 145 LBS | BODY MASS INDEX: 25.69 KG/M2

## 2024-10-26 PROCEDURE — 99213 OFFICE O/P EST LOW 20 MIN: CPT

## 2024-10-26 PROCEDURE — G2211 COMPLEX E/M VISIT ADD ON: CPT

## 2024-10-29 ENCOUNTER — APPOINTMENT (OUTPATIENT)
Dept: OTOLARYNGOLOGY | Facility: CLINIC | Age: 34
End: 2024-10-29
Payer: COMMERCIAL

## 2024-10-29 ENCOUNTER — NON-APPOINTMENT (OUTPATIENT)
Age: 34
End: 2024-10-29

## 2024-10-29 VITALS
HEIGHT: 63 IN | HEART RATE: 62 BPM | BODY MASS INDEX: 24.8 KG/M2 | SYSTOLIC BLOOD PRESSURE: 114 MMHG | DIASTOLIC BLOOD PRESSURE: 77 MMHG | WEIGHT: 140 LBS

## 2024-10-29 DIAGNOSIS — M26.629 ARTHRALGIA OF TEMPOROMANDIBULAR JOINT,: ICD-10-CM

## 2024-10-29 DIAGNOSIS — J34.3 HYPERTROPHY OF NASAL TURBINATES: ICD-10-CM

## 2024-10-29 DIAGNOSIS — J32.0 CHRONIC MAXILLARY SINUSITIS: ICD-10-CM

## 2024-10-29 DIAGNOSIS — L29.9 PRURITUS, UNSPECIFIED: ICD-10-CM

## 2024-10-29 PROCEDURE — 99214 OFFICE O/P EST MOD 30 MIN: CPT | Mod: 25

## 2024-10-29 PROCEDURE — 31231 NASAL ENDOSCOPY DX: CPT

## 2024-10-29 RX ORDER — FLUOCINOLONE ACETONIDE 0.25 MG/G
0.03 OINTMENT TOPICAL TWICE DAILY
Qty: 1 | Refills: 4 | Status: ACTIVE | COMMUNITY
Start: 2024-10-29 | End: 1900-01-01

## 2024-10-29 RX ORDER — FLUTICASONE PROPIONATE 50 UG/1
50 SPRAY, METERED NASAL DAILY
Qty: 1 | Refills: 4 | Status: ACTIVE | COMMUNITY
Start: 2024-10-29 | End: 1900-01-01

## 2024-11-19 NOTE — ED ADULT NURSE NOTE - CHIEF COMPLAINT QUOTE
Amoxicillin 500 mg 3 times a day for 7 days was prescribed.  Call if symptoms persist.  
Blood pressure high today because patient has not taken any of her antihypertensive medications.  Continue same medications, advised patient to take her BP meds earlier in the day instead of afternoon.  
Last A1c was elevated, so she has increased her Lantus and NovoLog doses.  A1c will be rechecked at the next follow-up visit.    
Pt comes in for chest pressure that started a few minutes ago. Headache for 3 days as well. Denies actual pain. No cardiac hx.

## 2024-12-03 ENCOUNTER — NON-APPOINTMENT (OUTPATIENT)
Age: 34
End: 2024-12-03

## 2024-12-03 ENCOUNTER — APPOINTMENT (OUTPATIENT)
Dept: GASTROENTEROLOGY | Facility: CLINIC | Age: 34
End: 2024-12-03
Payer: COMMERCIAL

## 2024-12-03 VITALS
DIASTOLIC BLOOD PRESSURE: 80 MMHG | SYSTOLIC BLOOD PRESSURE: 112 MMHG | BODY MASS INDEX: 24.8 KG/M2 | WEIGHT: 140 LBS | HEIGHT: 63 IN

## 2024-12-03 DIAGNOSIS — K21.9 GASTRO-ESOPHAGEAL REFLUX DISEASE W/OUT ESOPHAGITIS: ICD-10-CM

## 2024-12-03 PROCEDURE — 99214 OFFICE O/P EST MOD 30 MIN: CPT

## 2025-01-13 ENCOUNTER — NON-APPOINTMENT (OUTPATIENT)
Age: 35
End: 2025-01-13

## 2025-01-16 ENCOUNTER — APPOINTMENT (OUTPATIENT)
Dept: FAMILY MEDICINE | Facility: CLINIC | Age: 35
End: 2025-01-16

## 2025-01-16 DIAGNOSIS — A08.4 VIRAL INTESTINAL INFECTION, UNSPECIFIED: ICD-10-CM

## 2025-01-16 PROCEDURE — 99213 OFFICE O/P EST LOW 20 MIN: CPT

## 2025-01-16 RX ORDER — ONDANSETRON 4 MG/1
4 TABLET, ORALLY DISINTEGRATING ORAL
Qty: 15 | Refills: 0 | Status: ACTIVE | COMMUNITY
Start: 2025-01-16 | End: 1900-01-01

## 2025-01-22 DIAGNOSIS — J11.1 INFLUENZA DUE TO UNIDENTIFIED INFLUENZA VIRUS WITH OTHER RESPIRATORY MANIFESTATIONS: ICD-10-CM

## 2025-01-24 LAB
FLUAV RNA NPH QL NAA+NON-PROBE: DETECTED
RESP PATH DNA+RNA PNL NPH NAA+NON-PROBE: DETECTED
SARS-COV-2 RNA RESP QL NAA+PROBE: NOT DETECTED

## 2025-02-07 ENCOUNTER — APPOINTMENT (OUTPATIENT)
Dept: FAMILY MEDICINE | Facility: CLINIC | Age: 35
End: 2025-02-07
Payer: COMMERCIAL

## 2025-02-07 VITALS
OXYGEN SATURATION: 98 % | TEMPERATURE: 97.2 F | HEIGHT: 63 IN | BODY MASS INDEX: 24.8 KG/M2 | HEART RATE: 88 BPM | DIASTOLIC BLOOD PRESSURE: 72 MMHG | SYSTOLIC BLOOD PRESSURE: 110 MMHG | WEIGHT: 140 LBS

## 2025-02-07 DIAGNOSIS — R10.84 GENERALIZED ABDOMINAL PAIN: ICD-10-CM

## 2025-02-07 DIAGNOSIS — E55.9 VITAMIN D DEFICIENCY, UNSPECIFIED: ICD-10-CM

## 2025-02-07 DIAGNOSIS — R68.89 OTHER GENERAL SYMPTOMS AND SIGNS: ICD-10-CM

## 2025-02-07 DIAGNOSIS — R10.30 LOWER ABDOMINAL PAIN, UNSPECIFIED: ICD-10-CM

## 2025-02-07 PROCEDURE — 36415 COLL VENOUS BLD VENIPUNCTURE: CPT

## 2025-02-07 PROCEDURE — 99213 OFFICE O/P EST LOW 20 MIN: CPT

## 2025-02-08 ENCOUNTER — TRANSCRIPTION ENCOUNTER (OUTPATIENT)
Age: 35
End: 2025-02-08

## 2025-02-08 LAB
25(OH)D3 SERPL-MCNC: 12.6 NG/ML
ALBUMIN SERPL ELPH-MCNC: 4.5 G/DL
ALP BLD-CCNC: 81 U/L
ALT SERPL-CCNC: 11 U/L
ANION GAP SERPL CALC-SCNC: 10 MMOL/L
AST SERPL-CCNC: 16 U/L
BASOPHILS # BLD AUTO: 0.02 K/UL
BASOPHILS NFR BLD AUTO: 0.3 %
BILIRUB SERPL-MCNC: 0.7 MG/DL
BUN SERPL-MCNC: 11 MG/DL
CALCIUM SERPL-MCNC: 9.3 MG/DL
CHLORIDE SERPL-SCNC: 105 MMOL/L
CO2 SERPL-SCNC: 25 MMOL/L
CREAT SERPL-MCNC: 0.78 MG/DL
EGFR: 103 ML/MIN/1.73M2
EOSINOPHIL # BLD AUTO: 0.08 K/UL
EOSINOPHIL NFR BLD AUTO: 1.4 %
ERYTHROCYTE [SEDIMENTATION RATE] IN BLOOD BY WESTERGREN METHOD: 13 MM/HR
FOLATE SERPL-MCNC: 8.2 NG/ML
GLUCOSE SERPL-MCNC: 93 MG/DL
HCT VFR BLD CALC: 37.7 %
HGB BLD-MCNC: 12 G/DL
IMM GRANULOCYTES NFR BLD AUTO: 0.2 %
IRON SATN MFR SERPL: 17 %
IRON SERPL-MCNC: 46 UG/DL
LYMPHOCYTES # BLD AUTO: 2.37 K/UL
LYMPHOCYTES NFR BLD AUTO: 41 %
MAN DIFF?: NORMAL
MCHC RBC-ENTMCNC: 29.6 PG
MCHC RBC-ENTMCNC: 31.8 G/DL
MCV RBC AUTO: 93.1 FL
MONOCYTES # BLD AUTO: 0.38 K/UL
MONOCYTES NFR BLD AUTO: 6.6 %
NEUTROPHILS # BLD AUTO: 2.92 K/UL
NEUTROPHILS NFR BLD AUTO: 50.5 %
PLATELET # BLD AUTO: 268 K/UL
POTASSIUM SERPL-SCNC: 4.3 MMOL/L
PROT SERPL-MCNC: 7.2 G/DL
RBC # BLD: 4.05 M/UL
RBC # FLD: 13.2 %
SODIUM SERPL-SCNC: 140 MMOL/L
T3FREE SERPL-MCNC: 3 PG/ML
T4 FREE SERPL-MCNC: 0.9 NG/DL
TIBC SERPL-MCNC: 278 UG/DL
TSH SERPL-ACNC: 0.98 UIU/ML
UIBC SERPL-MCNC: 232 UG/DL
VIT B12 SERPL-MCNC: 401 PG/ML
WBC # FLD AUTO: 5.78 K/UL

## 2025-02-09 ENCOUNTER — NON-APPOINTMENT (OUTPATIENT)
Age: 35
End: 2025-02-09

## 2025-02-11 LAB — CA SERPL QL: NORMAL

## 2025-03-05 ENCOUNTER — APPOINTMENT (OUTPATIENT)
Dept: FAMILY MEDICINE | Facility: CLINIC | Age: 35
End: 2025-03-05
Payer: COMMERCIAL

## 2025-03-05 ENCOUNTER — NON-APPOINTMENT (OUTPATIENT)
Age: 35
End: 2025-03-05

## 2025-03-05 ENCOUNTER — APPOINTMENT (OUTPATIENT)
Dept: FAMILY MEDICINE | Facility: CLINIC | Age: 35
End: 2025-03-05

## 2025-03-05 VITALS
HEART RATE: 79 BPM | BODY MASS INDEX: 24.8 KG/M2 | SYSTOLIC BLOOD PRESSURE: 104 MMHG | TEMPERATURE: 98.3 F | DIASTOLIC BLOOD PRESSURE: 62 MMHG | HEIGHT: 63 IN | WEIGHT: 140 LBS | OXYGEN SATURATION: 98 %

## 2025-03-05 DIAGNOSIS — I45.10 UNSPECIFIED RIGHT BUNDLE-BRANCH BLOCK: ICD-10-CM

## 2025-03-05 DIAGNOSIS — M79.10 MYALGIA, UNSPECIFIED SITE: ICD-10-CM

## 2025-03-05 DIAGNOSIS — R94.31 ABNORMAL ELECTROCARDIOGRAM [ECG] [EKG]: ICD-10-CM

## 2025-03-05 DIAGNOSIS — R07.89 OTHER CHEST PAIN: ICD-10-CM

## 2025-03-05 PROCEDURE — 99213 OFFICE O/P EST LOW 20 MIN: CPT

## 2025-03-06 ENCOUNTER — NON-APPOINTMENT (OUTPATIENT)
Age: 35
End: 2025-03-06

## 2025-03-11 ENCOUNTER — APPOINTMENT (OUTPATIENT)
Dept: CARDIOLOGY | Facility: CLINIC | Age: 35
End: 2025-03-11
Payer: COMMERCIAL

## 2025-03-11 VITALS
WEIGHT: 146 LBS | HEART RATE: 78 BPM | HEIGHT: 63 IN | SYSTOLIC BLOOD PRESSURE: 114 MMHG | DIASTOLIC BLOOD PRESSURE: 70 MMHG | OXYGEN SATURATION: 99 % | BODY MASS INDEX: 25.87 KG/M2

## 2025-03-11 PROCEDURE — 99204 OFFICE O/P NEW MOD 45 MIN: CPT

## 2025-03-11 PROCEDURE — 93000 ELECTROCARDIOGRAM COMPLETE: CPT

## 2025-03-11 RX ORDER — METHOCARBAMOL 500 MG/1
500 TABLET, FILM COATED ORAL EVERY 4 HOURS
Qty: 18 | Refills: 0 | Status: DISCONTINUED | COMMUNITY
Start: 2025-03-05 | End: 2025-03-11

## 2025-03-12 LAB
CHOLEST SERPL-MCNC: 155 MG/DL
HDLC SERPL-MCNC: 49 MG/DL
LDLC SERPL CALC-MCNC: 90 MG/DL
NONHDLC SERPL-MCNC: 106 MG/DL
TRIGL SERPL-MCNC: 87 MG/DL

## 2025-03-20 NOTE — ED STATDOCS - NS ED MD DISPO DISCHARGE [facility]
Continue with diet exercise lifestyle modification last cholesterol level 216 HDL is good at 73 will follow-up with repeat lipid profile   Patient/Caregiver provided printed discharge information.

## 2025-04-28 ENCOUNTER — APPOINTMENT (OUTPATIENT)
Dept: FAMILY MEDICINE | Facility: CLINIC | Age: 35
End: 2025-04-28
Payer: COMMERCIAL

## 2025-04-28 VITALS
OXYGEN SATURATION: 98 % | SYSTOLIC BLOOD PRESSURE: 112 MMHG | WEIGHT: 146 LBS | HEART RATE: 70 BPM | BODY MASS INDEX: 25.87 KG/M2 | TEMPERATURE: 98 F | HEIGHT: 63 IN | DIASTOLIC BLOOD PRESSURE: 68 MMHG

## 2025-04-28 DIAGNOSIS — J30.9 ALLERGIC RHINITIS, UNSPECIFIED: ICD-10-CM

## 2025-04-28 PROCEDURE — 99214 OFFICE O/P EST MOD 30 MIN: CPT

## 2025-04-28 RX ORDER — MONTELUKAST 10 MG/1
10 TABLET, FILM COATED ORAL
Qty: 30 | Refills: 2 | Status: ACTIVE | COMMUNITY
Start: 2025-04-28 | End: 1900-01-01

## 2025-05-28 NOTE — ED ADULT NURSE NOTE - AS SC BRADEN SENSORY
How Severe Are Your Spot(S)?: mild Have Your Spot(S) Been Treated In The Past?: has not been treated Hpi Title: Evaluation of a Skin Lesion (4) no impairment

## 2025-06-16 ENCOUNTER — APPOINTMENT (OUTPATIENT)
Dept: FAMILY MEDICINE | Facility: CLINIC | Age: 35
End: 2025-06-16
Payer: COMMERCIAL

## 2025-06-16 VITALS
SYSTOLIC BLOOD PRESSURE: 112 MMHG | TEMPERATURE: 98.2 F | OXYGEN SATURATION: 99 % | HEART RATE: 82 BPM | HEIGHT: 63 IN | DIASTOLIC BLOOD PRESSURE: 64 MMHG | BODY MASS INDEX: 26.22 KG/M2 | WEIGHT: 148 LBS

## 2025-06-16 PROCEDURE — 99395 PREV VISIT EST AGE 18-39: CPT

## 2025-06-16 PROCEDURE — 92552 PURE TONE AUDIOMETRY AIR: CPT

## 2025-06-16 PROCEDURE — 36415 COLL VENOUS BLD VENIPUNCTURE: CPT

## 2025-06-19 LAB
25(OH)D3 SERPL-MCNC: 16.5 NG/ML
ALBUMIN SERPL ELPH-MCNC: 4.4 G/DL
ALP BLD-CCNC: 83 U/L
ALT SERPL-CCNC: 16 U/L
ANION GAP SERPL CALC-SCNC: 13 MMOL/L
APPEARANCE: CLEAR
AST SERPL-CCNC: 18 U/L
BACTERIA: NEGATIVE /HPF
BILIRUB SERPL-MCNC: 0.5 MG/DL
BILIRUBIN URINE: NEGATIVE
BLOOD URINE: NEGATIVE
BUN SERPL-MCNC: 11 MG/DL
CALCIUM SERPL-MCNC: 9.2 MG/DL
CAST: 0 /LPF
CHLORIDE SERPL-SCNC: 103 MMOL/L
CHOLEST SERPL-MCNC: 139 MG/DL
CO2 SERPL-SCNC: 23 MMOL/L
COLOR: YELLOW
CREAT SERPL-MCNC: 0.73 MG/DL
EGFRCR SERPLBLD CKD-EPI 2021: 111 ML/MIN/1.73M2
EPITHELIAL CELLS: 1 /HPF
ESTIMATED AVERAGE GLUCOSE: 114 MG/DL
GLUCOSE QUALITATIVE U: NEGATIVE MG/DL
GLUCOSE SERPL-MCNC: 95 MG/DL
HBA1C MFR BLD HPLC: 5.6 %
HCT VFR BLD CALC: 38.9 %
HDLC SERPL-MCNC: 43 MG/DL
HGB BLD-MCNC: 12.3 G/DL
KETONES URINE: NEGATIVE MG/DL
LDLC SERPL-MCNC: 76 MG/DL
LEUKOCYTE ESTERASE URINE: NEGATIVE
MCHC RBC-ENTMCNC: 30.1 PG
MCHC RBC-ENTMCNC: 31.6 G/DL
MCV RBC AUTO: 95.1 FL
MICROSCOPIC-UA: NORMAL
NITRITE URINE: NEGATIVE
NONHDLC SERPL-MCNC: 96 MG/DL
PH URINE: 6.5
PLATELET # BLD AUTO: 220 K/UL
POTASSIUM SERPL-SCNC: 3.8 MMOL/L
PROT SERPL-MCNC: 7 G/DL
PROTEIN URINE: NEGATIVE MG/DL
RBC # BLD: 4.09 M/UL
RBC # FLD: 13.5 %
RED BLOOD CELLS URINE: 0 /HPF
SODIUM SERPL-SCNC: 138 MMOL/L
SPECIFIC GRAVITY URINE: 1.01
T3FREE SERPL-MCNC: 2.82 PG/ML
T4 FREE SERPL-MCNC: 0.9 NG/DL
TRIGL SERPL-MCNC: 110 MG/DL
TSH SERPL-ACNC: 1.54 UIU/ML
UROBILINOGEN URINE: 0.2 MG/DL
VIT B12 SERPL-MCNC: 358 PG/ML
WBC # FLD AUTO: 6.62 K/UL
WHITE BLOOD CELLS URINE: 0 /HPF

## 2025-08-10 ENCOUNTER — EMERGENCY (EMERGENCY)
Facility: HOSPITAL | Age: 35
LOS: 0 days | Discharge: HOME | End: 2025-08-10
Attending: STUDENT IN AN ORGANIZED HEALTH CARE EDUCATION/TRAINING PROGRAM
Payer: COMMERCIAL

## 2025-08-10 VITALS
SYSTOLIC BLOOD PRESSURE: 109 MMHG | OXYGEN SATURATION: 100 % | RESPIRATION RATE: 16 BRPM | DIASTOLIC BLOOD PRESSURE: 72 MMHG | HEART RATE: 62 BPM

## 2025-08-10 VITALS — HEIGHT: 68 IN

## 2025-08-10 DIAGNOSIS — K59.00 CONSTIPATION, UNSPECIFIED: ICD-10-CM

## 2025-08-10 DIAGNOSIS — R39.11 HESITANCY OF MICTURITION: ICD-10-CM

## 2025-08-10 DIAGNOSIS — Z97.5 PRESENCE OF (INTRAUTERINE) CONTRACEPTIVE DEVICE: ICD-10-CM

## 2025-08-10 DIAGNOSIS — R10.33 PERIUMBILICAL PAIN: ICD-10-CM

## 2025-08-10 DIAGNOSIS — R11.0 NAUSEA: ICD-10-CM

## 2025-08-10 DIAGNOSIS — Z90.49 ACQUIRED ABSENCE OF OTHER SPECIFIED PARTS OF DIGESTIVE TRACT: Chronic | ICD-10-CM

## 2025-08-10 DIAGNOSIS — K21.9 GASTRO-ESOPHAGEAL REFLUX DISEASE WITHOUT ESOPHAGITIS: ICD-10-CM

## 2025-08-10 LAB
ALBUMIN SERPL ELPH-MCNC: 3.9 G/DL — SIGNIFICANT CHANGE UP (ref 3.3–5)
ALP SERPL-CCNC: 71 U/L — SIGNIFICANT CHANGE UP (ref 40–120)
ALT FLD-CCNC: 18 U/L — SIGNIFICANT CHANGE UP (ref 12–78)
ANION GAP SERPL CALC-SCNC: 1 MMOL/L — LOW (ref 5–17)
APPEARANCE UR: CLEAR — SIGNIFICANT CHANGE UP
AST SERPL-CCNC: 12 U/L — LOW (ref 15–37)
BACTERIA # UR AUTO: NEGATIVE /HPF — SIGNIFICANT CHANGE UP
BASOPHILS # BLD AUTO: 0.02 K/UL — SIGNIFICANT CHANGE UP (ref 0–0.2)
BASOPHILS NFR BLD AUTO: 0.3 % — SIGNIFICANT CHANGE UP (ref 0–2)
BILIRUB SERPL-MCNC: 1.2 MG/DL — SIGNIFICANT CHANGE UP (ref 0.2–1.2)
BILIRUB UR-MCNC: NEGATIVE — SIGNIFICANT CHANGE UP
BUN SERPL-MCNC: 9 MG/DL — SIGNIFICANT CHANGE UP (ref 7–23)
CALCIUM SERPL-MCNC: 9.2 MG/DL — SIGNIFICANT CHANGE UP (ref 8.5–10.1)
CAST: 0 /LPF — SIGNIFICANT CHANGE UP (ref 0–4)
CHLORIDE SERPL-SCNC: 106 MMOL/L — SIGNIFICANT CHANGE UP (ref 96–108)
CO2 SERPL-SCNC: 30 MMOL/L — SIGNIFICANT CHANGE UP (ref 22–31)
COLOR SPEC: YELLOW — SIGNIFICANT CHANGE UP
CREAT SERPL-MCNC: 0.73 MG/DL — SIGNIFICANT CHANGE UP (ref 0.5–1.3)
DIFF PNL FLD: ABNORMAL
EGFR: 111 ML/MIN/1.73M2 — SIGNIFICANT CHANGE UP
EGFR: 111 ML/MIN/1.73M2 — SIGNIFICANT CHANGE UP
EOSINOPHIL # BLD AUTO: 0.06 K/UL — SIGNIFICANT CHANGE UP (ref 0–0.5)
EOSINOPHIL NFR BLD AUTO: 1 % — SIGNIFICANT CHANGE UP (ref 0–6)
GLUCOSE SERPL-MCNC: 78 MG/DL — SIGNIFICANT CHANGE UP (ref 70–99)
GLUCOSE UR QL: NEGATIVE MG/DL — SIGNIFICANT CHANGE UP
HCG SERPL-ACNC: <1 MIU/ML — SIGNIFICANT CHANGE UP
HCT VFR BLD CALC: 37.2 % — SIGNIFICANT CHANGE UP (ref 34.5–45)
HGB BLD-MCNC: 12 G/DL — SIGNIFICANT CHANGE UP (ref 11.5–15.5)
IMM GRANULOCYTES # BLD AUTO: 0.02 K/UL — SIGNIFICANT CHANGE UP (ref 0–0.07)
IMM GRANULOCYTES NFR BLD AUTO: 0.3 % — SIGNIFICANT CHANGE UP (ref 0–0.9)
KETONES UR QL: NEGATIVE MG/DL — SIGNIFICANT CHANGE UP
LEUKOCYTE ESTERASE UR-ACNC: NEGATIVE — SIGNIFICANT CHANGE UP
LIDOCAIN IGE QN: 55 U/L — SIGNIFICANT CHANGE UP (ref 13–75)
LYMPHOCYTES # BLD AUTO: 2.56 K/UL — SIGNIFICANT CHANGE UP (ref 1–3.3)
LYMPHOCYTES NFR BLD AUTO: 43.8 % — SIGNIFICANT CHANGE UP (ref 13–44)
MCHC RBC-ENTMCNC: 29.9 PG — SIGNIFICANT CHANGE UP (ref 27–34)
MCHC RBC-ENTMCNC: 32.3 G/DL — SIGNIFICANT CHANGE UP (ref 32–36)
MCV RBC AUTO: 92.8 FL — SIGNIFICANT CHANGE UP (ref 80–100)
MONOCYTES # BLD AUTO: 0.51 K/UL — SIGNIFICANT CHANGE UP (ref 0–0.9)
MONOCYTES NFR BLD AUTO: 8.7 % — SIGNIFICANT CHANGE UP (ref 2–14)
NEUTROPHILS # BLD AUTO: 2.67 K/UL — SIGNIFICANT CHANGE UP (ref 1.8–7.4)
NEUTROPHILS NFR BLD AUTO: 45.9 % — SIGNIFICANT CHANGE UP (ref 43–77)
NITRITE UR-MCNC: NEGATIVE — SIGNIFICANT CHANGE UP
NRBC # BLD AUTO: 0 K/UL — SIGNIFICANT CHANGE UP (ref 0–0)
NRBC # FLD: 0 K/UL — SIGNIFICANT CHANGE UP (ref 0–0)
NRBC BLD AUTO-RTO: 0 /100 WBCS — SIGNIFICANT CHANGE UP (ref 0–0)
PH UR: 6 — SIGNIFICANT CHANGE UP (ref 5–8)
PLATELET # BLD AUTO: 218 K/UL — SIGNIFICANT CHANGE UP (ref 150–400)
PMV BLD: 10.7 FL — SIGNIFICANT CHANGE UP (ref 7–13)
POTASSIUM SERPL-MCNC: 3.6 MMOL/L — SIGNIFICANT CHANGE UP (ref 3.5–5.3)
POTASSIUM SERPL-SCNC: 3.6 MMOL/L — SIGNIFICANT CHANGE UP (ref 3.5–5.3)
PROT SERPL-MCNC: 7.5 GM/DL — SIGNIFICANT CHANGE UP (ref 6–8.3)
PROT UR-MCNC: NEGATIVE MG/DL — SIGNIFICANT CHANGE UP
RBC # BLD: 4.01 M/UL — SIGNIFICANT CHANGE UP (ref 3.8–5.2)
RBC # FLD: 13.2 % — SIGNIFICANT CHANGE UP (ref 10.3–14.5)
RBC CASTS # UR COMP ASSIST: 0 /HPF — SIGNIFICANT CHANGE UP (ref 0–4)
SODIUM SERPL-SCNC: 137 MMOL/L — SIGNIFICANT CHANGE UP (ref 135–145)
SP GR SPEC: 1 — SIGNIFICANT CHANGE UP (ref 1–1.03)
SQUAMOUS # UR AUTO: 0 /HPF — SIGNIFICANT CHANGE UP (ref 0–5)
UROBILINOGEN FLD QL: 0.2 MG/DL — SIGNIFICANT CHANGE UP (ref 0.2–1)
WBC # BLD: 5.84 K/UL — SIGNIFICANT CHANGE UP (ref 3.8–10.5)
WBC # FLD AUTO: 5.84 K/UL — SIGNIFICANT CHANGE UP (ref 3.8–10.5)
WBC UR QL: 0 /HPF — SIGNIFICANT CHANGE UP (ref 0–5)

## 2025-08-10 PROCEDURE — 36000 PLACE NEEDLE IN VEIN: CPT | Mod: XU

## 2025-08-10 PROCEDURE — 74177 CT ABD & PELVIS W/CONTRAST: CPT | Mod: 26

## 2025-08-10 PROCEDURE — 36415 COLL VENOUS BLD VENIPUNCTURE: CPT

## 2025-08-10 PROCEDURE — 74177 CT ABD & PELVIS W/CONTRAST: CPT

## 2025-08-10 PROCEDURE — 85025 COMPLETE CBC W/AUTO DIFF WBC: CPT

## 2025-08-10 PROCEDURE — 80053 COMPREHEN METABOLIC PANEL: CPT

## 2025-08-10 PROCEDURE — 81001 URINALYSIS AUTO W/SCOPE: CPT

## 2025-08-10 PROCEDURE — 99285 EMERGENCY DEPT VISIT HI MDM: CPT

## 2025-08-10 PROCEDURE — 84702 CHORIONIC GONADOTROPIN TEST: CPT

## 2025-08-10 PROCEDURE — 83690 ASSAY OF LIPASE: CPT

## 2025-08-10 PROCEDURE — 99284 EMERGENCY DEPT VISIT MOD MDM: CPT | Mod: 25

## 2025-08-10 RX ADMIN — Medication 1000 MILLILITER(S): at 12:34

## 2025-09-02 ENCOUNTER — APPOINTMENT (OUTPATIENT)
Dept: GASTROENTEROLOGY | Facility: CLINIC | Age: 35
End: 2025-09-02
Payer: COMMERCIAL

## 2025-09-02 VITALS
WEIGHT: 148 LBS | DIASTOLIC BLOOD PRESSURE: 70 MMHG | HEIGHT: 63 IN | BODY MASS INDEX: 26.22 KG/M2 | SYSTOLIC BLOOD PRESSURE: 108 MMHG

## 2025-09-02 DIAGNOSIS — K21.9 GASTRO-ESOPHAGEAL REFLUX DISEASE W/OUT ESOPHAGITIS: ICD-10-CM

## 2025-09-02 DIAGNOSIS — R68.81 EARLY SATIETY: ICD-10-CM

## 2025-09-02 DIAGNOSIS — K59.00 CONSTIPATION, UNSPECIFIED: ICD-10-CM

## 2025-09-02 DIAGNOSIS — R10.9 UNSPECIFIED ABDOMINAL PAIN: ICD-10-CM

## 2025-09-02 PROCEDURE — 99204 OFFICE O/P NEW MOD 45 MIN: CPT
